# Patient Record
Sex: FEMALE | Race: BLACK OR AFRICAN AMERICAN | NOT HISPANIC OR LATINO | Employment: STUDENT | URBAN - METROPOLITAN AREA
[De-identification: names, ages, dates, MRNs, and addresses within clinical notes are randomized per-mention and may not be internally consistent; named-entity substitution may affect disease eponyms.]

---
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
## 2025-01-08 ENCOUNTER — TELEPHONE (OUTPATIENT)
Dept: OBSTETRICS AND GYNECOLOGY | Facility: CLINIC | Age: 20
End: 2025-01-08
Payer: MEDICAID

## 2025-01-08 ENCOUNTER — PATIENT MESSAGE (OUTPATIENT)
Facility: CLINIC | Age: 20
End: 2025-01-08
Payer: MEDICAID

## 2025-01-08 NOTE — TELEPHONE ENCOUNTER
----- Message from Shot[place] sent at 1/6/2025  1:10 PM CST -----  Type:  Needs Medical Advice    Who Called:  pt mother   Symptoms (please be specific):  severe headaches /ongoing bleeding    How long has patient had these symptoms:   pt has had a cycle since 11/22/2024   Pharmacy name and phone #:   n/a   Would the patient rather a call back or a response via [facility]? Call back   Best Call Back Number:  [phone] are [phone] and this her mother number  Additional Information:

## 2025-03-21 ENCOUNTER — TELEPHONE (OUTPATIENT)
Dept: PRIMARY CARE CLINIC | Facility: CLINIC | Age: 20
End: 2025-03-21
Payer: MEDICAID

## 2025-03-21 NOTE — TELEPHONE ENCOUNTER
Pt c/o Hx of extended menstrual cycles with heavy flow. Pt c/o being light headed and fatigued. This nurse instructed pt and proxy to go to ED to be seen and appt scheduled for 3-24-25 with SHREE aponte. Pt and proxy verbalized understand. No acute distress noted during call

## 2025-03-21 NOTE — TELEPHONE ENCOUNTER
----- Message from Anita sent at 3/21/2025  9:13 AM CDT -----  Contact: [phone] [facility]/mom  Pt thought her appt was for 10 AM today. They were putting the address in the phone and then noticed the time was for 9 AM. Mom is only off on Friday's and states it is very important pt be seen urgently. She would like to know if there is anyway pt would still be seen today even if they have to wait? Please call mom to discuss her being rs, mom states they can even wait until the last appt if needed.

## 2025-03-24 ENCOUNTER — OFFICE VISIT (OUTPATIENT)
Dept: PRIMARY CARE CLINIC | Facility: CLINIC | Age: 20
End: 2025-03-24
Payer: MEDICAID

## 2025-03-24 VITALS
DIASTOLIC BLOOD PRESSURE: 80 MMHG | OXYGEN SATURATION: 99 % | TEMPERATURE: 98 F | BODY MASS INDEX: 27.06 KG/M2 | WEIGHT: 145.19 LBS | HEART RATE: 84 BPM | SYSTOLIC BLOOD PRESSURE: 119 MMHG

## 2025-03-24 DIAGNOSIS — Z98.890 HISTORY OF BILATERAL BREAST REDUCTION SURGERY: ICD-10-CM

## 2025-03-24 DIAGNOSIS — N92.6 IRREGULAR MENSTRUAL CYCLE: ICD-10-CM

## 2025-03-24 DIAGNOSIS — H61.23 BILATERAL IMPACTED CERUMEN: ICD-10-CM

## 2025-03-24 DIAGNOSIS — Z11.3 SCREEN FOR STD (SEXUALLY TRANSMITTED DISEASE): ICD-10-CM

## 2025-03-24 DIAGNOSIS — Z13.29 SCREENING FOR THYROID DISORDER: ICD-10-CM

## 2025-03-24 DIAGNOSIS — F40.10 SOCIAL ANXIETY DISORDER: ICD-10-CM

## 2025-03-24 DIAGNOSIS — L30.9 ECZEMA, UNSPECIFIED TYPE: ICD-10-CM

## 2025-03-24 DIAGNOSIS — Z13.1 SCREENING FOR DIABETES MELLITUS: ICD-10-CM

## 2025-03-24 DIAGNOSIS — D57.3 SICKLE CELL TRAIT: ICD-10-CM

## 2025-03-24 DIAGNOSIS — R10.9 ABDOMINAL CRAMPING: ICD-10-CM

## 2025-03-24 DIAGNOSIS — Z00.00 HEALTHCARE MAINTENANCE: ICD-10-CM

## 2025-03-24 DIAGNOSIS — F41.1 GAD (GENERALIZED ANXIETY DISORDER): ICD-10-CM

## 2025-03-24 DIAGNOSIS — Z11.59 ENCOUNTER FOR HEPATITIS C SCREENING TEST FOR LOW RISK PATIENT: ICD-10-CM

## 2025-03-24 DIAGNOSIS — Z13.220 SCREENING CHOLESTEROL LEVEL: ICD-10-CM

## 2025-03-24 DIAGNOSIS — Z11.4 SCREENING FOR HIV (HUMAN IMMUNODEFICIENCY VIRUS): ICD-10-CM

## 2025-03-24 DIAGNOSIS — R53.83 FATIGUE, UNSPECIFIED TYPE: ICD-10-CM

## 2025-03-24 DIAGNOSIS — R14.0 ABDOMINAL BLOATING: Primary | ICD-10-CM

## 2025-03-24 DIAGNOSIS — Z71.2 ENCOUNTER TO DISCUSS TEST RESULTS: ICD-10-CM

## 2025-03-24 DIAGNOSIS — Z80.0 FAMILY HISTORY OF STOMACH CANCER: ICD-10-CM

## 2025-03-24 PROCEDURE — 99215 OFFICE O/P EST HI 40 MIN: CPT | Mod: S$PBB,,, | Performed by: NURSE PRACTITIONER

## 2025-03-24 PROCEDURE — 81003 URINALYSIS AUTO W/O SCOPE: CPT | Performed by: NURSE PRACTITIONER

## 2025-03-24 PROCEDURE — 1160F RVW MEDS BY RX/DR IN RCRD: CPT | Mod: CPTII,,, | Performed by: NURSE PRACTITIONER

## 2025-03-24 PROCEDURE — 3074F SYST BP LT 130 MM HG: CPT | Mod: CPTII,,, | Performed by: NURSE PRACTITIONER

## 2025-03-24 PROCEDURE — 3079F DIAST BP 80-89 MM HG: CPT | Mod: CPTII,,, | Performed by: NURSE PRACTITIONER

## 2025-03-24 PROCEDURE — 99999 PR PBB SHADOW E&M-EST. PATIENT-LVL V: CPT | Mod: PBBFAC,,, | Performed by: NURSE PRACTITIONER

## 2025-03-24 PROCEDURE — 3008F BODY MASS INDEX DOCD: CPT | Mod: CPTII,,, | Performed by: NURSE PRACTITIONER

## 2025-03-24 PROCEDURE — 99215 OFFICE O/P EST HI 40 MIN: CPT | Mod: PBBFAC,PN | Performed by: NURSE PRACTITIONER

## 2025-03-24 PROCEDURE — 1159F MED LIST DOCD IN RCRD: CPT | Mod: CPTII,,, | Performed by: NURSE PRACTITIONER

## 2025-03-24 RX ORDER — TRIAMCINOLONE ACETONIDE 0.25 MG/G
CREAM TOPICAL 2 TIMES DAILY PRN
Qty: 80 G | Refills: 1 | Status: SHIPPED | OUTPATIENT
Start: 2025-03-24

## 2025-03-24 NOTE — PATIENT INSTRUCTIONS
Please get your labs done at the laboratory today, once you have checked out for this appointment, we will get you schedule for  labs to be done today.    I will communicate your laboratory and/or imaging results with you through your [facility] account that was set up through the portal, it was a pleasure meeting and taking care of you today.       [facility]  [address] [place] [id number]    You can call any of the following numbers to schedule your referral, if you could not get scheduled today: [phone] or [phone].     [phone]   Fax [phone]

## 2025-03-24 NOTE — PROGRESS NOTES
Subjective:       Patient ID: Beti Moser is a 19 y.o. female.    Chief Complaint: Menstrual Problem and multiple concerns    History of Present Illness  CHIEF COMPLAINT:  Patient presents today for irregular menstrual cycles    MENSTRUAL HISTORY:  She reports irregular menstrual cycles since February 2023. She had a period in February 2023, followed by amenorrhea until June. In June, she experienced menstrual bleeding lasting approximately one month, followed by amenorrhea until November. From November to February 2024, she had continuous menstrual bleeding.    CURRENT SYMPTOMS:  She experiences dizziness and motion sickness. She reports abdominal cramping occurring a few times per week with current pain rated as 3/10. She experiences fatigue at work requiring breaks.    MEDICAL HISTORY:  She has sickle cell trait diagnosed at age 5 at [facility], generalized anxiety, and a history of eczema with current recurrence.    FAMILY HISTORY:  She has diabetes on both sides of the family affecting her mother and grandmother. Her maternal and paternal grandfathers had stomach cancer. Her maternal grandmother had autonomic dysfunction resulting in death at age 64. She expresses concern about hereditary autonomic dysfunction, noting similarities between current symptoms and her mother's symptoms.    SURGICAL HISTORY:  She underwent bilateral breast reduction surgery on February 6, 2023.    SOCIAL HISTORY:  She is not sexually active.     History reviewed. No pertinent past medical history.     Past Surgical History:   Procedure Laterality Date    REDUCTION OF BOTH BREASTS Bilateral 2/6/2024    Procedure: MAMMOPLASTY, REDUCTION, BILATERAL;  Surgeon: Chad Hernandez MD;  Location: [facility] OR [address] [place] [address];  Service: Plastics;  Laterality: Bilateral;        Family History   Problem Relation Name Age of Onset    Migraines Father      Migraines Maternal Grandmother         Tobacco Use History[1]    Social History     Social History  Narrative    Not on file       Review of patient's allergies indicates:  No Known Allergies     Review of Systems   Constitutional:  Positive for fatigue.   Respiratory:  Negative for chest tightness, shortness of breath and wheezing.    Cardiovascular:  Negative for chest pain and palpitations.   Gastrointestinal:  Negative for nausea and vomiting.   Neurological:  Negative for dizziness, light-headedness and headaches.   Psychiatric/Behavioral:  The patient is nervous/anxious.          Objective:      Vitals:    03/24/25 0852   BP: 119/80   BP Location: Right arm   Patient Position: Sitting   Pulse: 84   Temp: 97.5 °F (36.4 °C)   TempSrc: Oral   SpO2: 99%   Weight: 65.9 kg (145 lb 2.8 oz)      Physical Exam  HENT:      Right Ear: There is impacted cerumen.      Left Ear: There is impacted cerumen.      Nose: No congestion or rhinorrhea.      Mouth/Throat:      Mouth: Mucous membranes are moist.      Pharynx: Oropharynx is clear.   Eyes:      Conjunctiva/sclera: Conjunctivae normal.   Pulmonary:      Effort: Pulmonary effort is normal. No respiratory distress.   Neurological:      Mental Status: She is alert and oriented to person, place, and time.   Psychiatric:         Mood and Affect: Mood is anxious.         Speech: Speech is delayed.         Behavior: Behavior is slowed.         Assessment:       1. Abdominal bloating    2. Irregular menstrual cycle    3. Abdominal cramping    4. Fatigue, unspecified type    5. Sickle cell trait    6. Healthcare maintenance    7. Eczema, unspecified type    8. Social anxiety disorder    9. JASON (generalized anxiety disorder)    10. Bilateral impacted cerumen    11. Screening cholesterol level    12. Screening for HIV (human immunodeficiency virus)    13. Encounter for hepatitis C screening test for low risk patient    14. Screening for thyroid disorder    15. Screening for diabetes mellitus    16. History of bilateral breast reduction surgery    17. Family history of stomach  cancer    18. Screen for STD (sexually transmitted disease)    19. Encounter to discuss test results        Plan:       Abdominal bloating  Rule out gastrointestinal disorders, cyst.  -     US Abdomen Complete; Future; Expected date: 03/24/2025    Irregular menstrual cycle  Concerns about menstrual cycle lasting from November 2024-February 2025, reports abnormal cycle since breast reduction, Feb 2024.  -     Ambulatory referral/consult to Gynecology; Future; Expected date: 03/24/2025    Abdominal cramping  -     POCT urine pregnancy    Fatigue, unspecified type  -     Urinalysis, Reflex to Urine Culture  -     ZAYRA Screen w/Reflex; Future; Expected date: 03/24/2025  -     Sedimentation rate; Future; Expected date: 03/24/2025  -     Cryoglobulin; Future; Expected date: 03/24/2025  -     Rheumatoid Factor; Future; Expected date: 03/24/2025  -     Iron and TIBC; Future; Expected date: 03/24/2025  -     Ferritin; Future; Expected date: 03/24/2025  -     Ivan-Barr Virus (EBV) Antibody Panel; Future; Expected date: 03/24/2025    Sickle cell trait  Previously diagnosed at age 4-5 years old.  -     Hemoglobin Electrophoresis,Hgb A2 Cuong.; Future; Expected date: 03/24/2025    Healthcare maintenance  -     CBC Auto Differential; Future; Expected date: 03/24/2025  -     Comprehensive Metabolic Panel; Future; Expected date: 03/24/2025  -     Vitamin D; Future; Expected date: 03/24/2025    Eczema, unspecified type  -     triamcinolone acetonide 0.025% (KENALOG) 0.025 % cream; Apply topically 2 (two) times daily as needed (dry skin on body). Apply to affected area. Do not apply to face and armpits. External use only.  Dispense: 80 g; Refill: 1    Social anxiety disorder  -     Ambulatory referral/consult to Psychiatry; Future; Expected date: 03/24/2025    JASON (generalized anxiety disorder)  -     Ambulatory referral/consult to Psychiatry; Future; Expected date: 03/24/2025    Bilateral impacted cerumen    Screening cholesterol  level  -     Lipid Panel; Future; Expected date: 03/24/2025    Screening for HIV (human immunodeficiency virus)  -     HIV 1/2 Ag/Ab (4th Gen); Future; Expected date: 03/24/2025    Encounter for hepatitis C screening test for low risk patient  -     Hepatitis C Antibody; Future; Expected date: 03/24/2025    Screening for thyroid disorder  -     T4, Free; Future; Expected date: 03/24/2025  -     TSH; Future; Expected date: 03/24/2025    Screening for diabetes mellitus  -     Hemoglobin A1C; Future; Expected date: 03/24/2025    History of bilateral breast reduction surgery  Comments:  2/06/2024    Family history of stomach cancer  Comments:  maternal and paternal grandfathers    Screen for STD (sexually transmitted disease)  -     Cancel: C. trachomatis/N. gonorrheae by AMP DNA; Future; Expected date: 03/24/2025  -     C. trachomatis/N. gonorrheae by AMP DNA    Encounter to discuss test results  10/2024  Review and discussion of test results       Assessment & Plan  ECZEMA:  - Educated on importance of proper skin moisturization, especially for eczema management.  -Maintain hydration.  - Continued triamcinolone cream for eczema management.    FATIGUE:  - Discussed benefits of regular physical activity for overall health and fatigue management.  - Recommend increasing physical activity by finding a park to walk in at least 2 times per week.  - Ordered comprehensive labs including CBC, CMP, thyroid panel, vitamin D, diabetes screening, autoimmune markers (ZAYRA), urinalysis, and pregnancy test to investigate fatigue, dizziness, and irregular menstrual cycles.    GENERAL ANXIETY DISORDER:  - Explained importance of journaling for mental health and stress management.  - Patient to start journaling daily, particularly before bed, to address anxiety and process emotions.  - Referred to [facility] for evaluation and treatment.    ABDOMINAL BLOATING:  - Scheduled abdominal US to evaluate reported abdominal pain and  bloating.    IRREGULAR MENSTRUAL CYCLE:  - Referred to Gynecology.    GENERAL HEALTH MANAGEMENT:  - Recommend limiting consumption of sugary drinks.      FOLLOW-UP CARE:  - Follow up in 2 weeks to review lab results and ultrasound findings.  - Contact the office if any concerning symptoms arise before follow-up visit.     Medication List with Changes/Refills   Changed and/or Refilled Medications    Modified Medication Previous Medication    TRIAMCINOLONE ACETONIDE 0.025% (KENALOG) 0.025 % CREAM triamcinolone acetonide 0.025% (KENALOG) 0.025 % cream       Apply topically 2 (two) times daily as needed (dry skin on body). Apply to affected area. Do not apply to face and armpits. External use only.    Apply topically 2 (two) times daily as needed (dry skin on body).   Discontinued Medications    CETIRIZINE (ZYRTEC) 10 MG TABLET    Take 1 tablet (10 mg total) by mouth once daily.    FLUTICASONE PROPIONATE (FLONASE) 50 MCG/ACTUATION NASAL SPRAY    1 spray (50 mcg total) by Each Nostril route once daily.    MAGNESIUM OXIDE 200 MG MAGNESIUM TAB    Take 1 tab daily    NAPROXEN (NAPROSYN) 250 MG TABLET    Take 1 tablet (250 mg total) by mouth 2 (two) times daily.            Follow up in about 2 weeks (around 4/7/2025) for Alessia Mcneil, MSN, APRN, FNP-C.    I spent a total of 55 minutes on the day of the visit.This includes face to face time and non-face to face time preparing to see the patient (eg, review of tests), obtaining and/or reviewing separately obtained history, documenting clinical information in the electronic or other health record, independently interpreting results and communicating results to the patient/family/caregiver, or care coordinator.   ADRIEL Carreno, MSN, FNP-C     This note was generated with the assistance of ambient listening technology. Verbal consent was obtained by the patient and accompanying visitor(s) for the recording of patient appointment to facilitate this note. I attest to  having reviewed and edited the generated note for accuracy, though some syntax or spelling errors may persist. Please contact the author of this note for any clarification.            [1]   Social History  Tobacco Use   Smoking Status Never   Smokeless Tobacco Never

## 2025-03-25 ENCOUNTER — HOSPITAL ENCOUNTER (OUTPATIENT)
Dept: RADIOLOGY | Facility: HOSPITAL | Age: 20
Discharge: HOME | End: 2025-03-25
Attending: NURSE PRACTITIONER
Payer: MEDICAID

## 2025-03-25 DIAGNOSIS — R14.0 ABDOMINAL BLOATING: ICD-10-CM

## 2025-03-25 LAB
BILIRUB UR QL STRIP.AUTO: NEGATIVE
CLARITY UR: CLEAR
COLOR UR AUTO: YELLOW
GLUCOSE UR QL STRIP: NEGATIVE
HGB UR QL STRIP: NEGATIVE
KETONES UR QL STRIP: NEGATIVE
LEUKOCYTE ESTERASE UR QL STRIP: NEGATIVE
NITRITE UR QL STRIP: NEGATIVE
PH UR STRIP: 7 [PH]
PROT UR QL STRIP: NEGATIVE
SP GR UR STRIP: 1.01
UROBILINOGEN UR STRIP-ACNC: NEGATIVE EU/DL

## 2025-03-25 PROCEDURE — 76700 US EXAM ABDOM COMPLETE: CPT | Mod: 26,,, | Performed by: RADIOLOGY

## 2025-03-25 PROCEDURE — 76700 US EXAM ABDOM COMPLETE: CPT | Mod: TC

## 2025-03-27 ENCOUNTER — PATIENT MESSAGE (OUTPATIENT)
Dept: PSYCHOLOGY | Facility: CLINIC | Age: 20
End: 2025-03-27
Payer: MEDICAID

## 2025-03-27 ENCOUNTER — RESULTS FOLLOW-UP (OUTPATIENT)
Dept: PRIMARY CARE CLINIC | Facility: CLINIC | Age: 20
End: 2025-03-27
Payer: MEDICAID

## 2025-03-27 DIAGNOSIS — B27.00 POSITIVE TEST FOR EPSTEIN-BARR VIRUS (EBV): ICD-10-CM

## 2025-03-27 DIAGNOSIS — D57.3 SICKLE CELL TRAIT: ICD-10-CM

## 2025-03-27 DIAGNOSIS — E55.9 VITAMIN D INSUFFICIENCY: Primary | ICD-10-CM

## 2025-03-27 PROCEDURE — 99999 PR PBB SHADOW E&M-EST. PATIENT-LVL I: CPT | Mod: PBBFAC,,, | Performed by: NURSE PRACTITIONER

## 2025-03-28 DIAGNOSIS — B27.90 EBV INFECTION: Primary | ICD-10-CM

## 2025-03-28 RX ORDER — ERGOCALCIFEROL 1.25 MG/1
50000 CAPSULE ORAL
Qty: 4 CAPSULE | Refills: 1 | Status: SHIPPED | OUTPATIENT
Start: 2025-03-28 | End: 2025-04-22 | Stop reason: SDUPTHER

## 2025-03-31 ENCOUNTER — OFFICE VISIT (OUTPATIENT)
Dept: OBSTETRICS AND GYNECOLOGY | Facility: CLINIC | Age: 20
End: 2025-03-31
Payer: MEDICAID

## 2025-03-31 VITALS
SYSTOLIC BLOOD PRESSURE: 118 MMHG | BODY MASS INDEX: 27.47 KG/M2 | WEIGHT: 145.5 LBS | HEIGHT: 61 IN | DIASTOLIC BLOOD PRESSURE: 70 MMHG

## 2025-03-31 DIAGNOSIS — D57.3 SICKLE CELL TRAIT: Primary | ICD-10-CM

## 2025-03-31 DIAGNOSIS — E28.2 PCOS (POLYCYSTIC OVARIAN SYNDROME): ICD-10-CM

## 2025-03-31 DIAGNOSIS — N92.6 IRREGULAR MENSTRUAL CYCLE: ICD-10-CM

## 2025-03-31 PROCEDURE — 1160F RVW MEDS BY RX/DR IN RCRD: CPT | Mod: CPTII,,, | Performed by: OBSTETRICS & GYNECOLOGY

## 2025-03-31 PROCEDURE — 3078F DIAST BP <80 MM HG: CPT | Mod: CPTII,,, | Performed by: OBSTETRICS & GYNECOLOGY

## 2025-03-31 PROCEDURE — 1159F MED LIST DOCD IN RCRD: CPT | Mod: CPTII,,, | Performed by: OBSTETRICS & GYNECOLOGY

## 2025-03-31 PROCEDURE — 99204 OFFICE O/P NEW MOD 45 MIN: CPT | Mod: S$PBB,,, | Performed by: OBSTETRICS & GYNECOLOGY

## 2025-03-31 PROCEDURE — 99999 PR PBB SHADOW E&M-EST. PATIENT-LVL III: CPT | Mod: PBBFAC,,, | Performed by: OBSTETRICS & GYNECOLOGY

## 2025-03-31 PROCEDURE — 3074F SYST BP LT 130 MM HG: CPT | Mod: CPTII,,, | Performed by: OBSTETRICS & GYNECOLOGY

## 2025-03-31 PROCEDURE — 3008F BODY MASS INDEX DOCD: CPT | Mod: CPTII,,, | Performed by: OBSTETRICS & GYNECOLOGY

## 2025-03-31 PROCEDURE — 99213 OFFICE O/P EST LOW 20 MIN: CPT | Mod: PBBFAC | Performed by: OBSTETRICS & GYNECOLOGY

## 2025-03-31 PROCEDURE — 3044F HG A1C LEVEL LT 7.0%: CPT | Mod: CPTII,,, | Performed by: OBSTETRICS & GYNECOLOGY

## 2025-03-31 RX ORDER — NORGESTIMATE AND ETHINYL ESTRADIOL 0.25-0.035
1 KIT ORAL DAILY
Qty: 30 TABLET | Refills: 11 | Status: SHIPPED | OUTPATIENT
Start: 2025-03-31 | End: 2026-03-31

## 2025-03-31 NOTE — PROGRESS NOTES
Subjective     Patient ID: Beti Moser is a 19 y.o. female.    Chief Complaint:  Menstrual Problem (NP with irregular menses since 02/2024. Her cycles would come every other month or skip 2 months. Some cycles would last for 1-2 months.)      History of Present Illness  HPI  Patient brought in by her mother complaining of having irregular heavy menses for the past year.  Cycles comes every other month for awhile, then every three months.  Some would last for over a month.  Menarche at 10   Has never been sexually-active   History of acne and eczema. On various meds    Sickle cell trait      GYN & OB History  Patient's last menstrual period was 02/09/2025 (exact date).   Date of Last Pap: No result found    OB History   No obstetric history on file.     History reviewed. No pertinent past medical history.    Past Surgical History:   Procedure Laterality Date    REDUCTION OF BOTH BREASTS Bilateral 2/6/2024    Procedure: MAMMOPLASTY, REDUCTION, BILATERAL;  Surgeon: Chad Hernandez MD;  Location: [facility] OR [address] [place] [address];  Service: Plastics;  Laterality: Bilateral;       Family History   Problem Relation Name Age of Onset    Colon cancer Paternal Grandfather      Migraines Maternal Grandmother      Cancer Maternal Grandfather          stomach    Migraines Father         Social History     Socioeconomic History    Marital status: Single   Tobacco Use    Smoking status: Never    Smokeless tobacco: Never   Substance and Sexual Activity    Alcohol use: No    Drug use: No    Sexual activity: Never     Social Drivers of Health     Financial Resource Strain: Medium Risk (3/24/2025)    Overall Financial Resource Strain (CARDIA)     Difficulty of Paying Living Expenses: Somewhat hard   Food Insecurity: Food Insecurity Present (3/24/2025)    Hunger Vital Sign     Worried About Running Out of Food in the Last Year: Sometimes true     Ran Out of Food in the Last Year: Sometimes true   Transportation Needs: No Transportation Needs  (3/24/2025)    PRAPARE - Transportation     Lack of Transportation (Medical): No     Lack of Transportation (Non-Medical): No   Physical Activity: Unknown (3/24/2025)    Exercise Vital Sign     Days of Exercise per Week: 0 days   Stress: Stress Concern Present (3/24/2025)    [employer] - Occupational Stress Questionnaire     Feeling of Stress : Rather much   Housing Stability: High Risk (3/24/2025)    Housing Stability Vital Sign     Unable to Pay for Housing in the Last Year: Yes     Number of Times Moved in the Last Year: 0     Homeless in the Last Year: No       Current Medications[1]    Review of patient's allergies indicates:  No Known Allergies      Review of Systems  Review of Systems   Constitutional:  Negative for activity change, appetite change, chills, fatigue, fever and unexpected weight change.   HENT:  Negative for mouth sores.    Respiratory:  Negative for cough, shortness of breath and wheezing.    Cardiovascular:  Negative for chest pain and palpitations.   Gastrointestinal:  Negative for abdominal pain, bloating, blood in stool, constipation, nausea and vomiting.   Endocrine: Negative for diabetes and hot flashes.   Genitourinary:  Positive for menorrhagia, menstrual problem and vaginal bleeding. Negative for dysmenorrhea, dyspareunia, dysuria, frequency, hematuria, pelvic pain, urgency, vaginal discharge, vaginal pain, urinary incontinence, postcoital bleeding and vaginal odor.   Musculoskeletal:  Negative for back pain and myalgias.   Integumentary:  Negative for rash, breast mass and nipple discharge.   Neurological:  Negative for seizures and headaches.   Psychiatric/Behavioral:  Negative for depression and sleep disturbance. The patient is not nervous/anxious.    Breast: Negative for mass, mastodynia and nipple discharge         Objective   Physical Exam:   Constitutional: She appears well-developed and well-nourished. No distress.    HENT:   Head: Normocephalic  and atraumatic.    Eyes: EOM are normal.     Cardiovascular:  Normal rate.             Pulmonary/Chest: Effort normal. No respiratory distress.        Abdominal: Soft. She exhibits no distension. There is no abdominal tenderness. There is no rebound and no guarding.             Musculoskeletal: Normal range of motion.       Neurological: She is alert.    Skin: Skin is warm and dry.   Acanthosis nigricans on neck, between breasts and on abdomen    Psychiatric: She has a normal mood and affect.            Assessment and Plan     1. Sickle cell trait    2. Irregular menstrual cycle    3. PCOS (polycystic ovarian syndrome)           Plan:  I have discussed with the patient and her mother regarding her condition.  PCOS and metabolic syndrome with insulin insensitivity explained and discussed  Pelvic ultrasound ordered  Encourage exercise and weight loss  Sprintec to help regulate menses  Back in 2 months    Total time: 45 min                   [1]   Current Outpatient Medications   Medication Sig Dispense Refill    ergocalciferol (ERGOCALCIFEROL) 50,000 unit Cap Take 1 capsule (50,000 Units total) by mouth every 7 days. for 8 doses 4 capsule 1    triamcinolone acetonide 0.025% (KENALOG) 0.025 % cream Apply topically 2 (two) times daily as needed (dry skin on body). Apply to affected area. Do not apply to face and armpits. External use only. 80 g 1    norgestimate-ethinyl estradioL (SPRINTEC, 28,) 0.25-35 mg-mcg per tablet Take 1 tablet by mouth once daily. 30 tablet 11     No current facility-administered medications for this visit.

## 2025-04-07 ENCOUNTER — TELEPHONE (OUTPATIENT)
Dept: HEMATOLOGY/ONCOLOGY | Facility: CLINIC | Age: 20
End: 2025-04-07
Payer: MEDICAID

## 2025-04-07 ENCOUNTER — OFFICE VISIT (OUTPATIENT)
Dept: PRIMARY CARE CLINIC | Facility: CLINIC | Age: 20
End: 2025-04-07
Payer: MEDICAID

## 2025-04-07 VITALS
HEIGHT: 61 IN | OXYGEN SATURATION: 96 % | DIASTOLIC BLOOD PRESSURE: 80 MMHG | TEMPERATURE: 99 F | BODY MASS INDEX: 27.83 KG/M2 | WEIGHT: 147.38 LBS | HEART RATE: 86 BPM | SYSTOLIC BLOOD PRESSURE: 118 MMHG

## 2025-04-07 DIAGNOSIS — F40.10 SOCIAL ANXIETY DISORDER: ICD-10-CM

## 2025-04-07 DIAGNOSIS — R53.83 OTHER FATIGUE: ICD-10-CM

## 2025-04-07 DIAGNOSIS — F41.1 GAD (GENERALIZED ANXIETY DISORDER): ICD-10-CM

## 2025-04-07 DIAGNOSIS — E55.9 VITAMIN D INSUFFICIENCY: ICD-10-CM

## 2025-04-07 DIAGNOSIS — E28.2 PCOS (POLYCYSTIC OVARIAN SYNDROME): ICD-10-CM

## 2025-04-07 DIAGNOSIS — Z71.2 ENCOUNTER TO DISCUSS TEST RESULTS: Primary | ICD-10-CM

## 2025-04-07 DIAGNOSIS — R53.83 FATIGUE, UNSPECIFIED TYPE: ICD-10-CM

## 2025-04-07 DIAGNOSIS — D57.3 SICKLE CELL TRAIT: ICD-10-CM

## 2025-04-07 DIAGNOSIS — B27.00 POSITIVE TEST FOR EPSTEIN-BARR VIRUS (EBV): ICD-10-CM

## 2025-04-07 DIAGNOSIS — Z83.2 FAMILY HISTORY OF AUTOIMMUNE DISORDER: ICD-10-CM

## 2025-04-07 DIAGNOSIS — F90.9 ATTENTION DEFICIT HYPERACTIVITY DISORDER (ADHD), UNSPECIFIED ADHD TYPE: ICD-10-CM

## 2025-04-07 DIAGNOSIS — R14.0 ABDOMINAL BLOATING: ICD-10-CM

## 2025-04-07 PROCEDURE — 99214 OFFICE O/P EST MOD 30 MIN: CPT | Mod: PBBFAC,PN | Performed by: NURSE PRACTITIONER

## 2025-04-07 PROCEDURE — 3074F SYST BP LT 130 MM HG: CPT | Mod: CPTII,,, | Performed by: NURSE PRACTITIONER

## 2025-04-07 PROCEDURE — 3008F BODY MASS INDEX DOCD: CPT | Mod: CPTII,,, | Performed by: NURSE PRACTITIONER

## 2025-04-07 PROCEDURE — 99215 OFFICE O/P EST HI 40 MIN: CPT | Mod: S$PBB,,, | Performed by: NURSE PRACTITIONER

## 2025-04-07 PROCEDURE — 99999 PR PBB SHADOW E&M-EST. PATIENT-LVL IV: CPT | Mod: PBBFAC,,, | Performed by: NURSE PRACTITIONER

## 2025-04-07 PROCEDURE — 3044F HG A1C LEVEL LT 7.0%: CPT | Mod: CPTII,,, | Performed by: NURSE PRACTITIONER

## 2025-04-07 PROCEDURE — 1159F MED LIST DOCD IN RCRD: CPT | Mod: CPTII,,, | Performed by: NURSE PRACTITIONER

## 2025-04-07 PROCEDURE — 3079F DIAST BP 80-89 MM HG: CPT | Mod: CPTII,,, | Performed by: NURSE PRACTITIONER

## 2025-04-07 NOTE — PATIENT INSTRUCTIONS
You can call any of the following numbers to schedule your referral (hematology and immunology), if you could not get scheduled today: [phone] or [phone].       Ivan-Barr virus, or EBV, is one of the most common human viruses in the world. EBV is also known as human herpesvirus 4 and is a member of the herpes virus family.  Most people will get infected with EBV in their lifetime, especially in childhood, and will not have symptoms. EBV infections in children usually do not cause symptoms, or the symptoms are not distinguishable from other mild, brief childhood illnesses.

## 2025-04-07 NOTE — TELEPHONE ENCOUNTER
Unfortunately the [facility] does not schedule  sickle cell patients  as we are not equipped to medically serve them in an appropriate manner.  They are all seen at [facility]  Please contact then to schedule an appointment for patient . Columba birch RN  ===View-only below this line===  ----- Message -----  From: Nalini Esparza MA  Sent: 4/7/2025   9:58 AM CDT  To: Columba Birch RN  Subject: FW: Schedule patient                             Please advise   Thank You,  Nalini:MA  ----- Message -----  From: Jaleel Bo  Sent: 4/7/2025   9:32 AM CDT  To: [facility] Onc Clinical Staff  Subject: Schedule patient                                 Please reach out to the above patient to schedule appointment. Thanks

## 2025-04-07 NOTE — LETTER
April 7, 2025      [facility]  [address] [place] [address]       Patient: Beti Moser   YOB: 2005  Date of Visit: 04/07/2025    To Whom It May Concern:    Tram Moser  was at [facility] on 04/07/2025 with her mother (Lesley Mcneil). Please excuse Ms Cassandra Mcneil as she accompanied her daughter for this appointment.  If you have any questions or concerns, or if I can be of further assistance, please do not hesitate to contact me.    Sincerely,       Alessia Mcneil NP

## 2025-04-07 NOTE — PROGRESS NOTES
Subjective:       Patient ID: Beti Moser is a 19 y.o. female.    Chief Complaint: Follow-up (Test Results )    History of Present Illness    CHIEF COMPLAINT:  Patient presents today for follow up of fatigue, lab results, accompanied by her mother, Cassandra Mcneil.    FATIGUE:  She reports fatigue associated with periods of physical activity, denying sudden onset.    MENTAL HEALTH:  She was recently diagnosed with ADHD and generalized anxiety. She has initiated care with a psychiatrist, having attended her first appointment on Monday. She was referred to group therapy but prefers individual therapy sessions.    PCOS:  She was recently diagnosed with PCOS by gynecologist. Associated symptoms include bloating and excessive hair growth. She started birth control therapy last week with no significant changes noted yet.    LABS AND TEST RESULTS:  CBC was normal with no evidence of anemia. Electrolytes, kidney function, and liver function tests were normal. Cholesterol panel was normal. Vitamin D level was low. Thyroid function tests were normal. Testing was positive for Ivan Barr virus. Hemoglobin electrophoresis showed an irregular component likely attributed to sickle cell trait.      History reviewed. No pertinent past medical history.     Past Surgical History:   Procedure Laterality Date    REDUCTION OF BOTH BREASTS Bilateral 2/6/2024    Procedure: MAMMOPLASTY, REDUCTION, BILATERAL;  Surgeon: Chad Hernandez MD;  Location: [facility] OR [address] [place] [address];  Service: Plastics;  Laterality: Bilateral;        Family History   Problem Relation Name Age of Onset    Colon cancer Paternal Grandfather      Migraines Maternal Grandmother      Cancer Maternal Grandfather          stomach    Migraines Father         Tobacco Use History[1]    Social History     Social History Narrative    Not on file       Review of patient's allergies indicates:  No Known Allergies     Review of Systems   Constitutional:  Positive for fatigue.  "  Respiratory:  Negative for shortness of breath.    Cardiovascular:  Negative for chest pain and palpitations.   Gastrointestinal:  Negative for nausea and vomiting.   Neurological:  Negative for dizziness, light-headedness and headaches.         Objective:      Vitals:    04/07/25 0832   BP: 118/80   BP Location: Left arm   Patient Position: Sitting   Pulse: 86   Temp: 98.5 °F (36.9 °C)   TempSrc: Oral   SpO2: 96%   Weight: 66.8 kg (147 lb 6 oz)   Height: 5' 1" (1.549 m)        Physical Exam  Constitutional:       Appearance: Normal appearance.   Pulmonary:      Effort: Pulmonary effort is normal. No respiratory distress.   Neurological:      Mental Status: She is alert and oriented to person, place, and time.         Assessment:       1. Encounter to discuss test results    2. Positive test for Ivan-Barr virus (EBV)    3. Vitamin D insufficiency    4. Sickle cell trait    5. Fatigue, unspecified type    6. Social anxiety disorder    7. Abdominal bloating    8. PCOS (polycystic ovarian syndrome)    9. Attention deficit hyperactivity disorder (ADHD), unspecified ADHD type    10. JASON (generalized anxiety disorder)    11. Family history of autoimmune disorder        Plan:       Encounter to discuss test results  03/25/2024 Lab results and ultrasound imaging  Review and discussion of test results, all questions addressed and answered accordingly.    Positive test for Ivan-Barr virus (EBV)  Complains of fatigue.    Vitamin D insufficiency  Continue with vitamin d replacement as previously ordered.    Sickle cell trait    Fatigue, unspecified type    Social anxiety disorder  Stable, managed by psychiatry.    Abdominal bloating    PCOS (polycystic ovarian syndrome)    Attention deficit hyperactivity disorder (ADHD), unspecified ADHD type  Stable, managed by psychiatry.    JASON (generalized anxiety disorder)  Stable, managed by psychiatry.       Assessment & Plan    ATTENTION-DEFICIT HYPERACTIVITY DISORDER (ADHD):  - " Provided information on ADHD, its symptoms, and potential impact on daily life and academic performance.  - Patient reports difficulty focusing and fidgety behavior.  - Psychiatrist diagnosed ADHD after reviewing the patient's charts.  - Acknowledged ADHD diagnosis and discussed potential impact on college education.  - Psychiatrist plans to prescribe medication to manage ADHD symptoms.  - Recommend group therapy as part of the treatment plan.  - Advised the patient to continue with individual therapy sessions.  - Recommend participation in group therapy sessions at [facility].    GENERALIZED ANXIETY DISORDER:  - Psychiatrist diagnosed generalized anxiety after reviewing the patient's charts.  - Acknowledged anxiety diagnosis and discussed its impact on the patient's life.  - Psychiatrist plans to prescribe medication to manage anxiety symptoms.    POLYCYSTIC OVARIAN SYNDROME (PCOS):  - Discussed PCOS symptoms and their potential overlap with eczema.  - Patient reports bloating and hirsutism as symptoms of PCOS.  - Gynecologist diagnosed PCOS after exam.  - Acknowledged PCOS diagnosis and its connection to the patient's symptoms.  - Gynecologist prescribed oral contraceptives for PCOS management.  - Recommend annual follow-up with gynecologist.  - Patient started oral contraceptives recently for PCOS management.  - Patient reports no significant changes yet from oral contraceptive use.    FATIGUE:  - Patient reports ongoing fatigue, especially during physical activity.  - Observed improvement in the patient's appearance despite ongoing fatigue.  - Referred the patient to immunology for further evaluation family history of autoimmune disease.      VITAMIN D INSUFFICIENCY:  - Blood work revealed low vitamin D levels, 03/25/2025 Vitamin D-13.  - Prescribed vitamin D supplementation.    Positive test for Ivan-Barr virus (EBV):  - Explained Ivan-Barr virus infection, its common occurrence, and typical course.  -  Patient tested positive for Ivan-Barr virus.  - Explained Ivan-Barr virus infection and its potential relation to fatigue.  -Recommend treating symptoms as needed.  - Plan to provide more information about Ivan-Barr virus.    SICKLE CELL TRAIT:  - Patient has sickle cell trait previously confirmed by hemoglobin electrophoresis.  - Referred to hematology for further evaluation of sickle cell trait and potential related issues.    FOLLOW-UP:  - Follow up after scheduling appointments with hematology and immunology clinics.      Medication List with Changes/Refills   Current Medications    ERGOCALCIFEROL (ERGOCALCIFEROL) 50,000 UNIT CAP    Take 1 capsule (50,000 Units total) by mouth every 7 days. for 8 doses    NORGESTIMATE-ETHINYL ESTRADIOL (SPRINTEC, 28,) 0.25-35 MG-MCG PER TABLET    Take 1 tablet by mouth once daily.    TRIAMCINOLONE ACETONIDE 0.025% (KENALOG) 0.025 % CREAM    Apply topically 2 (two) times daily as needed (dry skin on body). Apply to affected area. Do not apply to face and armpits. External use only.        Follow up if symptoms worsen or fail to improve.    I spent a total of 45 minutes on the day of the visit.This includes face to face time and non-face to face time preparing to see the patient (eg, review of tests), obtaining and/or reviewing separately obtained history, documenting clinical information in the electronic or other health record, independently interpreting results and communicating results to the patient/family/caregiver, or care coordinator.     ADRIEL Carreno, MSN, FNP-C     This note was generated with the assistance of ambient listening technology. Verbal consent was obtained by the patient and accompanying visitor(s) for the recording of patient appointment to facilitate this note. I attest to having reviewed and edited the generated note for accuracy, though some syntax or spelling errors may persist. Please contact the author of this note for any  clarification.            [1]   Social History  Tobacco Use   Smoking Status Never   Smokeless Tobacco Never

## 2025-04-10 ENCOUNTER — TELEPHONE (OUTPATIENT)
Dept: HEMATOLOGY/ONCOLOGY | Facility: CLINIC | Age: 20
End: 2025-04-10
Payer: MEDICAID

## 2025-04-10 NOTE — TELEPHONE ENCOUNTER
Patient has a referral for SC trait Please assist with scheduling in [facility] clinic Thank you Columba can RN

## 2025-04-16 ENCOUNTER — RESULTS FOLLOW-UP (OUTPATIENT)
Dept: OBSTETRICS AND GYNECOLOGY | Facility: CLINIC | Age: 20
End: 2025-04-16

## 2025-04-16 ENCOUNTER — HOSPITAL ENCOUNTER (OUTPATIENT)
Dept: RADIOLOGY | Facility: HOSPITAL | Age: 20
Discharge: HOME | End: 2025-04-16
Attending: OBSTETRICS & GYNECOLOGY
Payer: MEDICAID

## 2025-04-16 DIAGNOSIS — E28.2 PCOS (POLYCYSTIC OVARIAN SYNDROME): ICD-10-CM

## 2025-04-16 DIAGNOSIS — N92.6 IRREGULAR MENSTRUAL CYCLE: ICD-10-CM

## 2025-04-16 PROCEDURE — 76856 US EXAM PELVIC COMPLETE: CPT | Mod: TC

## 2025-04-16 PROCEDURE — 76856 US EXAM PELVIC COMPLETE: CPT | Mod: 26,,, | Performed by: RADIOLOGY

## 2025-04-22 ENCOUNTER — LAB VISIT (OUTPATIENT)
Dept: LAB | Facility: HOSPITAL | Age: 20
End: 2025-04-22
Attending: PHYSICIAN ASSISTANT
Payer: MEDICAID

## 2025-04-22 ENCOUNTER — OFFICE VISIT (OUTPATIENT)
Dept: HEMATOLOGY/ONCOLOGY | Facility: CLINIC | Age: 20
End: 2025-04-22
Payer: MEDICAID

## 2025-04-22 VITALS
DIASTOLIC BLOOD PRESSURE: 76 MMHG | BODY MASS INDEX: 27.93 KG/M2 | TEMPERATURE: 99 F | WEIGHT: 147.94 LBS | SYSTOLIC BLOOD PRESSURE: 116 MMHG | OXYGEN SATURATION: 97 % | HEIGHT: 61 IN | RESPIRATION RATE: 20 BRPM | HEART RATE: 94 BPM

## 2025-04-22 DIAGNOSIS — N92.6 IRREGULAR MENSES: ICD-10-CM

## 2025-04-22 DIAGNOSIS — E55.9 VITAMIN D INSUFFICIENCY: ICD-10-CM

## 2025-04-22 DIAGNOSIS — R53.83 FATIGUE, UNSPECIFIED TYPE: ICD-10-CM

## 2025-04-22 DIAGNOSIS — R53.1 WEAKNESS: ICD-10-CM

## 2025-04-22 DIAGNOSIS — R13.10 DYSPHAGIA, UNSPECIFIED TYPE: ICD-10-CM

## 2025-04-22 DIAGNOSIS — R06.00 DYSPNEA, UNSPECIFIED TYPE: ICD-10-CM

## 2025-04-22 DIAGNOSIS — D57.3 SICKLE CELL TRAIT: Primary | ICD-10-CM

## 2025-04-22 LAB
FOLATE SERPL-MCNC: 7.3 NG/ML (ref 4–24)
VIT B12 SERPL-MCNC: 377 PG/ML (ref 210–950)

## 2025-04-22 PROCEDURE — 84425 ASSAY OF VITAMIN B-1: CPT

## 2025-04-22 PROCEDURE — 82746 ASSAY OF FOLIC ACID SERUM: CPT

## 2025-04-22 PROCEDURE — 99999 PR PBB SHADOW E&M-EST. PATIENT-LVL IV: CPT | Mod: PBBFAC,,, | Performed by: PHYSICIAN ASSISTANT

## 2025-04-22 PROCEDURE — 82607 VITAMIN B-12: CPT

## 2025-04-22 PROCEDURE — 93010 ELECTROCARDIOGRAM REPORT: CPT | Mod: ,,, | Performed by: INTERNAL MEDICINE

## 2025-04-22 PROCEDURE — 3074F SYST BP LT 130 MM HG: CPT | Mod: CPTII,,, | Performed by: PHYSICIAN ASSISTANT

## 2025-04-22 PROCEDURE — 3078F DIAST BP <80 MM HG: CPT | Mod: CPTII,,, | Performed by: PHYSICIAN ASSISTANT

## 2025-04-22 PROCEDURE — 1159F MED LIST DOCD IN RCRD: CPT | Mod: CPTII,,, | Performed by: PHYSICIAN ASSISTANT

## 2025-04-22 PROCEDURE — 99204 OFFICE O/P NEW MOD 45 MIN: CPT | Mod: S$PBB,,, | Performed by: PHYSICIAN ASSISTANT

## 2025-04-22 PROCEDURE — 3044F HG A1C LEVEL LT 7.0%: CPT | Mod: CPTII,,, | Performed by: PHYSICIAN ASSISTANT

## 2025-04-22 PROCEDURE — 1160F RVW MEDS BY RX/DR IN RCRD: CPT | Mod: CPTII,,, | Performed by: PHYSICIAN ASSISTANT

## 2025-04-22 PROCEDURE — 36415 COLL VENOUS BLD VENIPUNCTURE: CPT

## 2025-04-22 PROCEDURE — 99214 OFFICE O/P EST MOD 30 MIN: CPT | Mod: PBBFAC,25,PO | Performed by: PHYSICIAN ASSISTANT

## 2025-04-22 PROCEDURE — 84207 ASSAY OF VITAMIN B-6: CPT

## 2025-04-22 PROCEDURE — 93005 ELECTROCARDIOGRAM TRACING: CPT

## 2025-04-22 PROCEDURE — 3008F BODY MASS INDEX DOCD: CPT | Mod: CPTII,,, | Performed by: PHYSICIAN ASSISTANT

## 2025-04-22 RX ORDER — ERGOCALCIFEROL 1.25 MG/1
50000 CAPSULE ORAL
Qty: 4 CAPSULE | Refills: 2 | Status: SHIPPED | OUTPATIENT
Start: 2025-04-22 | End: 2025-07-09

## 2025-04-22 RX ORDER — FERROUS SULFATE 325(65) MG
325 TABLET, DELAYED RELEASE (ENTERIC COATED) ORAL EVERY OTHER DAY
Qty: 30 TABLET | Refills: 3 | Status: SHIPPED | OUTPATIENT
Start: 2025-04-22 | End: 2025-12-18

## 2025-04-22 NOTE — PROGRESS NOTES
HEMATOLOGY/ONCOLOGY CLINIC VISIT NOTE     PATIENT: Beti Moser  MRN: [id number]  DATE: 4/22/2025    Chief Complaint: Sickle Cell Trait     Subjective:    History of Present Illness: Ms. Moser is a 19 y.o. female who presents for evaluation and management of sickle cell trait, referred by family medicine NP Alessia Mcneil. Other diagnoses include PCOS, eczema, ADHD, vitamin D insufficiency, and anxiety.     She was seen in April by family medicine. Labs indicate mildly low iron saturation, normal ferritin, previous EBV infection. Electrophoresis with Hgb A 55% and HgbS of 41%, indicating sickle cell trait. CBC wnl, no anemia or microcytosis.     Patient presents with her mom today. Her mom reports that she has been having weakness, fatigue, joint pain, and overall malaise for years. Her symptoms seem to have worsened this past year. She even had one episode of syncope. She also reports intermittent dysphagia and the feeling of something being stuck in her throat since October. Her mother expresses frustration about her constellation of symptoms without a clear cause.      No recent sick contacts. Reports dizziness and headaches daily worse with going from laying/sitting to standing. She has weakness and fatigue. Joint pain in her knees and elbows. Also reports shortness of breath and chest pain intermittently with exertion. Has been home schooled since the 5th grade due to her chronic symptoms. She graduated last year from high school, does not have a job right now, but does plan to enroll in school for the fall to study business/finance.     Menses: Started OCPs 2 weeks ago. Irregular menses, length also inconsistent (can be from 3-4 days or has had a cycle that lasted 2 months);    Fam hx: mother and maternal great aunt with heavy menses;  mother is anemic; maternal grandmother had orthostatic blood pressures (autonomic dysfunction)    Social: usually 1L of water per day, no soda, does drink tea daily; coffee and  milk once a week; no dietary restrictions (eats a variety); does not smoke cigarettes, use other tobacco products, or recreational drugs; is not currently and has never been sexually active;    Past medical, surgical, family, and social histories have been reviewed and updated below.    Past Medical History:   Past Medical History:   Diagnosis Date    Frequent headaches 05/07/2015       Past Surgical History:   Past Surgical History:   Procedure Laterality Date    REDUCTION OF BOTH BREASTS Bilateral 2/6/2024    Procedure: MAMMOPLASTY, REDUCTION, BILATERAL;  Surgeon: Chad Hernandez MD;  Location: [facility] OR [address] [place] [address];  Service: Plastics;  Laterality: Bilateral;       Family History:   Family History   Problem Relation Name Age of Onset    Colon cancer Paternal Grandfather      Migraines Maternal Grandmother      Cancer Maternal Grandfather          stomach    Migraines Father         Social History:  reports that she has never smoked. She has never used smokeless tobacco. She reports that she does not drink alcohol and does not use drugs.    Allergies:  Review of patient's allergies indicates:  No Known Allergies    Medications:  Current Medications[1]    Review of Systems   Constitutional:  Positive for fatigue. Negative for appetite change and unexpected weight change.   Respiratory:  Positive for shortness of breath (with exertion).    Cardiovascular:  Positive for chest pain (intermittent).   Gastrointestinal:  Negative for blood in stool, constipation, diarrhea and vomiting.   Genitourinary:  Negative for hematuria.   Musculoskeletal:  Negative for neck pain and neck stiffness.        Has joint pain throughout the day - advil minimally helps   Skin:  Negative for rash.   Neurological:  Positive for dizziness (daily), weakness and headaches (daily).   Hematological:  Negative for adenopathy.       ECOG Performance Status:   ECOG SCORE    1 - Restricted in strenuous activity-ambulatory and able to carry out work of  "a light nature         Objective:      Vitals:   Vitals:    04/22/25 0922   BP: 116/76   BP Location: Left arm   Patient Position: Sitting   Pulse: 94   Resp: 20   Temp: 98.5 °F (36.9 °C)   TempSrc: Oral   SpO2: 97%   Weight: 67.1 kg (147 lb 14.9 oz)   Height: 5' 1" (1.549 m)     BMI: Body mass index is 27.95 kg/m².    Physical Exam  Constitutional:       General: She is not in acute distress.  Cardiovascular:      Rate and Rhythm: Normal rate and regular rhythm.      Pulses: Normal pulses.      Heart sounds: No murmur heard.  Pulmonary:      Effort: Pulmonary effort is normal. No respiratory distress.      Breath sounds: Normal breath sounds.   Lymphadenopathy:      Cervical: No cervical adenopathy.      Upper Body:      Right upper body: No supraclavicular or axillary adenopathy.      Left upper body: No supraclavicular or axillary adenopathy.   Skin:     Capillary Refill: Capillary refill takes less than 2 seconds.      Coloration: Skin is not jaundiced or pale.      Findings: No bruising.   Neurological:      General: No focal deficit present.      Mental Status: She is alert and oriented to person, place, and time.         Laboratory Data:  Labs have been reviewed.    Imaging:   3/24/2025 US ABDOMEN COMPLETE     CLINICAL HISTORY:  Abdominal distension (gaseous)     TECHNIQUE:  Complete abdominal ultrasound (including pancreas, aorta, liver, gallbladder, common bile duct, IVC, kidneys, and spleen) was performed.     COMPARISON:  None     FINDINGS:  Pancreas: The visualized portions of pancreas appear normal.     Aorta: No aneurysm.     Liver: 13.7 cm in length, normal in size. Homogeneous parenchymal echotexture. No focal lesions.     Gallbladder: No calculi, wall thickening, or pericholecystic fluid.  Negative sonographic Dietz's sign.     Biliary system: 3 a mm common bile duct.  No intrahepatic ductal dilatation.     Inferior vena cava: Normal in appearance.     Right kidney: 9.3 x 4.4 x 5.3 cm. No " hydronephrosis.     Left kidney: 9.1 x 5.8 x 5.5 cm. No hydronephrosis.     Spleen: 8.1 x 3.8 cm.  Normal in size with homogeneous echotexture.     Miscellaneous: No ascites.     Impression:     No significant abnormality.  Assessment:       1. Sickle cell trait    2. Vitamin D insufficiency    3. Weakness    4. Fatigue, unspecified type    5. Dyspnea, unspecified type    6. Dysphagia, unspecified type    7. Irregular menses      Plan:     Sickle Cell Trait   - Sickle Cell trait occurs when a person inherits one sickle cell gene and one normal gene. It is NOT the same as sickle cell disease. Most people with SCT do not have any symptoms. However, they can pass this gene to their children.   - Most people with sickle cell trait live normal, healthy lives. However, under extreme conditions complications can occur. Extreme conditions such as severe dehydration, adrianne intensity exercise (especially at high altitudes or in hot weather), and low oxygen levels can lead to rare complications including blood in urine and kidney dysfunction. It is important to stay hydrated, especially when exercising and avoid excessive exertion in extreme conditions.   - It is important to know the sickle cell trait status of your future partner when Beti wishes to have children. If one parent has sickle cell trait there is a 50% chance of any conceived children having this carrier state. If both partners have sickle cell trait there is a 25% of having a child with sickle cell disease, 50% chance of having a child with sickle cell trait, and 25% of having a child with neither.   - I discussed that her symptoms of weakness, fatigue, and joint pain (which seem to be chronic) are likely not due to her sickle cell trait. If symptoms from SCT occur they are usually acute due to extreme circumstances as listed above.     2. Vitamin D insufficiency  - level checked in March was low at 13  - Started on 50,000 units weekly. Recommend taking this  for 3 months and then rechecking level. If normalized can switch to 2000 units daily for maintenance.   - Vitamin D insufficiency/deficiency can contribute to muscle weakness or cramps, bone pain, joint pain or stiffness, generalized fatigue, sweating, unexplained weight gain, poor sleep, irritability, depression, etc.     3. Fatigue/Weakness/Dyspnea   - Vitamin D insufficiency can certainly contribute to some of these symptoms  - Will also check vitamin folate, B1, B6, and b12 levels   - Orthostatic blood pressures taken:    Laying down: 122/76, pulse 88   Sittin/80, pulse 95   Standin/77, pulse 100   - Does not meet criteria for orthostatic hypotension.  - Since she is continuing to have intermittent chest pain and TANG will order EKG. Consider cardiology referral for symptoms since workup has been thusfar negative   - ZAYRA and RF negative  - HIV/Hepatitis negative  - TSH normal  - Continue to monitor     4. Irregular Menses  - She has a mildly low iron saturation, normal ferritin, normal serum iron, and normal hemoglobin.   - Okay to start ferrous sulfate qOD for maintenance in this menstruating female. Rx sent.   - She started OCPs two weeks ago  - Will monitor     5. Dysphagia   - Reports this since 2024  - She showed a picture of throat from yesterday evening which looked like there was a tonsil stone on left tonsil, but it is not there today. No erythema.   - We discussed that tonsil stones are small, hard, whitish-yellow lumops that form in the crevice of tonsils. Likely from dead cells, bacteria, food debris, or mucus. They can cause bad breath, sore throat, or feeling like something is stuck. It is not there today, so it may have dislodged as they often do on their own.   - Prevention includes good oral hygiene and adequate water intake  - Referral to ENT placed at parent and patient request to further evaluate dysphagia.     Labs today. Call with results and follow up.     Angeles DALY  RADHA Martini   Hematology/Oncology  [facility]  [address], [address]  [place] [address]  Phone: [phone]  Fax: [phone]         [1]   Current Outpatient Medications   Medication Sig Dispense Refill    ergocalciferol (ERGOCALCIFEROL) 50,000 unit Cap Take 1 capsule (50,000 Units total) by mouth every 7 days. for 8 doses 4 capsule 1    norgestimate-ethinyl estradioL (SPRINTEC, 28,) 0.25-35 mg-mcg per tablet Take 1 tablet by mouth once daily. 30 tablet 11    triamcinolone acetonide 0.025% (KENALOG) 0.025 % cream Apply topically 2 (two) times daily as needed (dry skin on body). Apply to affected area. Do not apply to face and armpits. External use only. 80 g 1     No current facility-administered medications for this visit.

## 2025-04-23 ENCOUNTER — RESULTS FOLLOW-UP (OUTPATIENT)
Dept: HEMATOLOGY/ONCOLOGY | Facility: CLINIC | Age: 20
End: 2025-04-23

## 2025-04-23 LAB
OHS QRS DURATION: 82 MS
OHS QTC CALCULATION: 432 MS

## 2025-04-28 LAB — W VITAMIN B6: 8 UG/L

## 2025-05-05 DIAGNOSIS — E53.8 VITAMIN B12 DEFICIENCY: ICD-10-CM

## 2025-05-05 DIAGNOSIS — E53.1 VITAMIN B6 DEFICIENCY: ICD-10-CM

## 2025-05-05 DIAGNOSIS — E55.9 VITAMIN D INSUFFICIENCY: Primary | ICD-10-CM

## 2025-06-12 ENCOUNTER — OFFICE VISIT (OUTPATIENT)
Dept: OTOLARYNGOLOGY | Facility: CLINIC | Age: 20
End: 2025-06-12
Payer: MEDICAID

## 2025-06-12 VITALS
DIASTOLIC BLOOD PRESSURE: 76 MMHG | WEIGHT: 145 LBS | SYSTOLIC BLOOD PRESSURE: 122 MMHG | BODY MASS INDEX: 27.38 KG/M2 | HEIGHT: 61 IN | HEART RATE: 100 BPM

## 2025-06-12 DIAGNOSIS — J35.8 TONSILLOLITH: ICD-10-CM

## 2025-06-12 PROCEDURE — 1159F MED LIST DOCD IN RCRD: CPT | Mod: CPTII,S$GLB,, | Performed by: STUDENT IN AN ORGANIZED HEALTH CARE EDUCATION/TRAINING PROGRAM

## 2025-06-12 PROCEDURE — 3008F BODY MASS INDEX DOCD: CPT | Mod: CPTII,S$GLB,, | Performed by: STUDENT IN AN ORGANIZED HEALTH CARE EDUCATION/TRAINING PROGRAM

## 2025-06-12 PROCEDURE — 3074F SYST BP LT 130 MM HG: CPT | Mod: CPTII,S$GLB,, | Performed by: STUDENT IN AN ORGANIZED HEALTH CARE EDUCATION/TRAINING PROGRAM

## 2025-06-12 PROCEDURE — 99204 OFFICE O/P NEW MOD 45 MIN: CPT | Mod: S$GLB,,, | Performed by: STUDENT IN AN ORGANIZED HEALTH CARE EDUCATION/TRAINING PROGRAM

## 2025-06-12 PROCEDURE — 3078F DIAST BP <80 MM HG: CPT | Mod: CPTII,S$GLB,, | Performed by: STUDENT IN AN ORGANIZED HEALTH CARE EDUCATION/TRAINING PROGRAM

## 2025-06-12 PROCEDURE — 3044F HG A1C LEVEL LT 7.0%: CPT | Mod: CPTII,S$GLB,, | Performed by: STUDENT IN AN ORGANIZED HEALTH CARE EDUCATION/TRAINING PROGRAM

## 2025-06-12 NOTE — PROGRESS NOTES
Ear, Nose, & Throat  Otolaryngology - Head & Neck Surgery      Subjective:     Chief Complaint: Tonsil Stones    Beti Moser is a 19 y.o. female who was referred to me by Angeles Martini in consultation for tonsil stones.    Reports about a 1 year history of troublesome tonsil stones most often in the left side.  She is self expressed these in the past.  States she feels pinpoint pain when swallowing on that side.  No true dysphagia. No regurgitation. No change in voice or breathing.    She reports 4-5 throat infections over the past year. She has not been treated with antibiotics in the past year; however, she recalls to responding to Augmentin prior to this.     She is not on anticoagulation. She has a history of sickle trait, eczema, anxiety and PCOS.     Past Medical History  Active Ambulatory Problems     Diagnosis Date Noted    Sickle cell trait 05/13/2015    Social anxiety disorder 03/23/2016    Macromastia 02/06/2024    Eczema 09/30/2024    Other fatigue 03/25/2025     Resolved Ambulatory Problems     Diagnosis Date Noted    Frequent headaches 05/07/2015    Screening for sickle-cell disease or trait 05/07/2015    Recurrent fever 09/15/2015     No Additional Past Medical History       Past Surgical History  She has a past surgical history that includes Reduction of both breasts (Bilateral, 2/6/2024).    Past Surgical History:   Procedure Laterality Date    REDUCTION OF BOTH BREASTS Bilateral 2/6/2024    Procedure: MAMMOPLASTY, REDUCTION, BILATERAL;  Surgeon: Chad Hernandez MD;  Location: [facility] OR [address] [place] [address];  Service: Plastics;  Laterality: Bilateral;        Family History  Her family history includes Cancer in her maternal grandfather; Colon cancer in her paternal grandfather; Migraines in her father and maternal grandmother.    Social History  She reports that she has never smoked. She has never used smokeless tobacco. She reports that she does not drink alcohol and does not use  "drugs.    Allergies  She has no known allergies.    Medications  She has a current medication list which includes the following prescription(s): ergocalciferol, ferrous sulfate, norgestimate-ethinyl estradiol, and triamcinolone acetonide 0.025%.    ROS:  Pertinent positive and negative review of systems as noted in HPI.     Objective:     /76 (BP Location: Left arm, Patient Position: Sitting)   Pulse 100   Ht 5' 1" (1.549 m)   Wt 65.8 kg (145 lb)   BMI 27.40 kg/m²    Physical Exam  Constitutional: Well appearing, communicating. No acute distress  Voice: Euphonic  Head/Face: House Brackmann I Bilaterally.  Oropharynx:    Tonsils symmetric, 2+, cryptic, tonsil stones on the left   Burciaga Tongue Position: 3 (partial obstruction soft palate)  Neck/Lymphatic:    No masses or lesions   Trachea midline   Neck Circumference  Neuro/Psychiatric:     Affect: Appropriate   Eyes: EOMI intact  Respiratory:   No increased WOB, no stridor     Data Review:   MEDICAL RECORDS    I have reviewed the following medical records relevant to the care of this patient from unique sources outside of my institution or departmental specialty:  Primary Care and Hematology    LABS  Hemoglobin (g/dL)   Date Value   10/25/2024 13.9     HGB (gm/dL)   Date Value   03/25/2025 14.9     WBC (K/uL)   Date Value   03/25/2025 6.07     Platelet Count (K/uL)   Date Value   03/25/2025 342     Platelets (K/uL)   Date Value   10/25/2024 336     Creatinine (mg/dL)   Date Value   03/25/2025 0.7     Calcium (mg/dL)   Date Value   03/25/2025 9.7   08/16/2018 9.9     Lab Results   Component Value Date/Time    RAPSCRN Negative 09/30/2024 04:57 PM    STREPACULT No  Group A  Streptococcus isolated 06/06/2017 03:09 PM       I have independently reviewed the lab results shown above. Findings significant for the conditions being treated include: No anemia, all WNL, previous negative strep    IMAGING    No pertinent imaging available    AUDIO    not " performed    Procedures:       Assessment:     1. Tonsillolith      Plan:     After speaking to and examining the patient, I believe they meet criteria for tonsillectomy.  We discussed that the patient has 2 fundamental options: Observation with continued and intermittent antibiotics versus tonsillectomy.  I described tonsillectomy as an outpatient procedure in which the tonsils are removed through the mouth while under general anesthesia. I discussed that the patient would likely need 1-2 weeks of light activity to recover and may need to miss work/school during this time. I also discussed that most patient require narcotic pain medication and that would not be safe to drive while operating a vehicle or heavy machinery. I then discussed the risks, benefits, indications, and alternatives to tonsillectomy.  The risks were described to include, but not be limited to, infection, bleeding, scarring, failure to eliminate sore throats, and the need for additional procedures.  There is data that suggests that sore throats may occur less frequently or be less severe, although no warranties or guarantees were made or implied.  There is an increased risk of bleeding within the first 6 hours of surgery which returns between 5 and 14 days after surgery when the scabs resolve.  The patient was encouraged to drink lots of liquids, eat soft foods, and avoid sharp edged foods such as chips.  Time was allowed for questions, and all questions were answered to the patient's apparent satisfaction. The patient showed good understanding of their condition and wishes to proceed with surgery.       Voice recognition software was used in the creation of this note/communication and any sound-alike errors which may have occurred from its use should be taken in context when interpreting. If such errors prevent a clear understanding of the note/communication, please contact the office for clarification.     No orders of the defined types were  placed in this encounter.

## 2025-06-15 DIAGNOSIS — J35.8 TONSILLOLITH: Primary | ICD-10-CM

## 2025-06-23 ENCOUNTER — ANESTHESIA EVENT (OUTPATIENT)
Dept: SURGERY | Facility: OTHER | Age: 20
End: 2025-06-23
Payer: MEDICAID

## 2025-06-28 ENCOUNTER — PATIENT MESSAGE (OUTPATIENT)
Dept: OTOLARYNGOLOGY | Facility: CLINIC | Age: 20
End: 2025-06-28
Payer: MEDICAID

## 2025-06-30 ENCOUNTER — PATIENT MESSAGE (OUTPATIENT)
Dept: PREADMISSION TESTING | Facility: OTHER | Age: 20
End: 2025-06-30
Payer: MEDICAID

## 2025-06-30 RX ORDER — ESCITALOPRAM OXALATE 10 MG/1
10 TABLET ORAL DAILY
COMMUNITY
Start: 2025-06-02

## 2025-06-30 NOTE — PRE ADMISSION SCREENING
Information to Prepare you for your Surgery    PRE-ADMIT TESTING & SURGERY  [address] [facility] BUILDING  ENTRANCE 2       Your surgery has been scheduled at [facility]. We are pleased to have the opportunity to serve you. For Further Information please call [phone].    On the day of surgery please report to the Registration Desk on the 1st floor of the [facility]. This is a 4 story red brick building on the corner of [facility]. Turn onto [address]  to access the parking lot behind the [facility] at the corner of [facility].    CONTACT YOUR PHYSICIAN'S OFFICE THE DAY PRIOR TO YOUR SURGERY TO OBTAIN YOUR ARRIVAL TIME.     The evening before surgery do not eat anything after 9 p.m. ( this includes hard candy, chewing gum and mints).  You may only have GATORADE, POWERADE AND WATER  from 9 p.m. until you leave your home.   DO NOT DRINK ANY LIQUIDS ON THE WAY TO THE HOSPITAL.      Why does your anesthesiologist allow you to drink Gatorade/Powerade before surgery?  Gatorade/Powerade helps to increase your comfort before surgery and to decrease your nausea after surgery.   The carbohydrates in Gatorade/Powerade help reduce your body's stress response to surgery.    If you are a diabetic-drink only water prior to surgery.    Outpatient Surgery- May allow 2 adults (18 and older)/ Support Persons (1 being the designated ) for all surgical/procedural patients. A breastfeeding mother will be allowed her infant and 2 adult Support Persons. No one under the age of 18 will be allowed in the building.    MEDICATION INSTRUCTIONS:     Surgery Patients:  If you take ASPIRIN - Your PHYSICIAN/SURGEON will need to inform you IF/OR when you need to stop taking aspirin prior to your surgery.     Starting the week prior to surgery, do not take any medications containing IBUPROFEN or NSAIDS (Advil, Aleve, BC, Goody's, Ketorolac, Meloxicam, Mobic, Motrin, Naproxen,  Toradol, etc).  If you are not sure if you should take a medicine, please call your surgeon's office.  You may take Tylenol.    Do Not Wear any make-up (especially eye make-up) to surgery. Please remove any false eyelashes or eyelash extensions. If you arrive the day of surgery with makeup/eyelashes on you will be required to remove prior to surgery. (There is a risk of corneal abrasions if eye makeup/eyelash extensions are not removed)    Leave all valuables at home.   Do Not wear any jewelry or watches, including any metal in body piercings. Jewelry must be removed prior to coming to the hospital.  There is a possibility that rings that are unable to be removed may be cut off if they are on the surgical extremity.    Please remove all hair extensions, wigs, clips and any other metal accessories/ ornaments from your hair.  These items may pose a flammable/fire risk in Surgery and must be removed.    Do not shave your surgical area at least 5 days prior to your surgery. The surgical prep will be performed at the hospital according to Infection Control regulations.    Contact Lens must be removed before surgery. Either do not wear the contact lens or bring a case and solution for storage.  Please bring a container for eyeglasses or dentures as required.  Bring any paperwork your physician has provided, such as consent forms,  history and physicals, doctor's orders, etc.   Bring comfortable clothes that are loose fitting to wear upon discharge. Take into consideration the type of surgery being performed.  Maintain your diet as advised per your physician the day prior to surgery.    Adequate rest the night before surgery is advised.   Park in the Parking lot behind the hospital or in the [facility] across the street from the parking lot. Parking is complimentary.  If you will be discharged the same day as your procedure, please arrange for a responsible adult to drive you home or to accompany you if  traveling by taxi.   YOU WILL NOT BE PERMITTED TO DRIVE OR TO LEAVE THE HOSPITAL ALONE AFTER SURGERY.   If you are being discharged the same day, it is strongly recommended that you arrange for someone to remain with you for the first 24 hrs following your surgery.    The Surgeon will speak to your family/visitor after your surgery regarding the outcome of your surgery and post op care.  The Surgeon may speak to you after your surgery, but there is a possibility you may not remember the details.  Please check with your family members regarding the conversation with the Surgeon.    We strongly recommend whoever is bringing you home be present for discharge instructions.  This will ensure a thorough understanding for your post op home care.    If the patient has fever, cough, or signs/symptoms of Flu or Covid please do not come in for your surgery.   Contact your surgeon and your primary care physician for further instructions.               Bathing Instructions  Shower (no bath) the evening before and the morning of your procedure with antibacterial soap.  Wash your face with water and your regular face wash/soap  Use your regular shampoo  Dry off as usual Do not use any deodorant, powder, body lotions, perfume, after shave or cologne.

## 2025-07-03 ENCOUNTER — LAB VISIT (OUTPATIENT)
Dept: LAB | Facility: HOSPITAL | Age: 20
End: 2025-07-03
Attending: PHYSICIAN ASSISTANT
Payer: MEDICAID

## 2025-07-03 DIAGNOSIS — E53.1 VITAMIN B6 DEFICIENCY: ICD-10-CM

## 2025-07-03 DIAGNOSIS — E55.9 VITAMIN D INSUFFICIENCY: ICD-10-CM

## 2025-07-03 DIAGNOSIS — E53.8 VITAMIN B12 DEFICIENCY: ICD-10-CM

## 2025-07-03 LAB
ABSOLUTE EOSINOPHIL (OHS): 0.03 K/UL
ABSOLUTE MONOCYTE (OHS): 0.44 K/UL (ref 0.3–1)
ABSOLUTE NEUTROPHIL COUNT (OHS): 4.54 K/UL (ref 1.8–7.7)
ALBUMIN SERPL BCP-MCNC: 4.3 G/DL (ref 3.5–5.2)
ALP SERPL-CCNC: 69 UNIT/L (ref 40–150)
ALT SERPL W/O P-5'-P-CCNC: 12 UNIT/L (ref 10–44)
ANION GAP (OHS): 10 MMOL/L (ref 8–16)
AST SERPL-CCNC: 16 UNIT/L (ref 11–45)
BASOPHILS # BLD AUTO: 0.02 K/UL
BASOPHILS NFR BLD AUTO: 0.3 %
BILIRUB SERPL-MCNC: 0.5 MG/DL (ref 0.1–1)
BUN SERPL-MCNC: 9 MG/DL (ref 6–20)
CALCIUM SERPL-MCNC: 9.5 MG/DL (ref 8.7–10.5)
CHLORIDE SERPL-SCNC: 105 MMOL/L (ref 95–110)
CO2 SERPL-SCNC: 24 MMOL/L (ref 23–29)
CREAT SERPL-MCNC: 0.7 MG/DL (ref 0.5–1.4)
ERYTHROCYTE [DISTWIDTH] IN BLOOD BY AUTOMATED COUNT: 12.5 % (ref 11.5–14.5)
GFR SERPLBLD CREATININE-BSD FMLA CKD-EPI: >60 ML/MIN/1.73/M2
GLUCOSE SERPL-MCNC: 82 MG/DL (ref 70–110)
HCT VFR BLD AUTO: 42.4 % (ref 37–48.5)
HGB BLD-MCNC: 14.4 GM/DL (ref 12–16)
IMM GRANULOCYTES # BLD AUTO: 0.02 K/UL (ref 0–0.04)
IMM GRANULOCYTES NFR BLD AUTO: 0.3 % (ref 0–0.5)
LYMPHOCYTES # BLD AUTO: 2.18 K/UL (ref 1–4.8)
MCH RBC QN AUTO: 30.4 PG (ref 27–31)
MCHC RBC AUTO-ENTMCNC: 34 G/DL (ref 32–36)
MCV RBC AUTO: 90 FL (ref 82–98)
NUCLEATED RBC (/100WBC) (OHS): 0 /100 WBC
PLATELET # BLD AUTO: 298 K/UL (ref 150–450)
PMV BLD AUTO: 9.9 FL (ref 9.2–12.9)
POTASSIUM SERPL-SCNC: 4.4 MMOL/L (ref 3.5–5.1)
PROT SERPL-MCNC: 8.3 GM/DL (ref 6–8.4)
RBC # BLD AUTO: 4.74 M/UL (ref 4–5.4)
RELATIVE EOSINOPHIL (OHS): 0.4 %
RELATIVE LYMPHOCYTE (OHS): 30.2 % (ref 18–48)
RELATIVE MONOCYTE (OHS): 6.1 % (ref 4–15)
RELATIVE NEUTROPHIL (OHS): 62.7 % (ref 38–73)
SODIUM SERPL-SCNC: 139 MMOL/L (ref 136–145)
WBC # BLD AUTO: 7.23 K/UL (ref 3.9–12.7)

## 2025-07-03 PROCEDURE — 80053 COMPREHEN METABOLIC PANEL: CPT

## 2025-07-03 PROCEDURE — 82306 VITAMIN D 25 HYDROXY: CPT

## 2025-07-03 PROCEDURE — 82607 VITAMIN B-12: CPT

## 2025-07-03 PROCEDURE — 84207 ASSAY OF VITAMIN B-6: CPT

## 2025-07-03 PROCEDURE — 85025 COMPLETE CBC W/AUTO DIFF WBC: CPT

## 2025-07-03 PROCEDURE — 36415 COLL VENOUS BLD VENIPUNCTURE: CPT

## 2025-07-04 ENCOUNTER — PATIENT MESSAGE (OUTPATIENT)
Dept: OTOLARYNGOLOGY | Facility: CLINIC | Age: 20
End: 2025-07-04
Payer: MEDICAID

## 2025-07-04 LAB
25(OH)D3+25(OH)D2 SERPL-MCNC: 18 NG/ML (ref 30–96)
VIT B12 SERPL-MCNC: 435 PG/ML (ref 210–950)

## 2025-07-06 RX ORDER — DEXAMETHASONE SODIUM PHOSPHATE 4 MG/ML
8 INJECTION, SOLUTION INTRA-ARTICULAR; INTRALESIONAL; INTRAMUSCULAR; INTRAVENOUS; SOFT TISSUE
OUTPATIENT
Start: 2025-07-06

## 2025-07-07 ENCOUNTER — HOSPITAL ENCOUNTER (OUTPATIENT)
Facility: OTHER | Age: 20
Discharge: HOME | End: 2025-07-07
Attending: STUDENT IN AN ORGANIZED HEALTH CARE EDUCATION/TRAINING PROGRAM | Admitting: STUDENT IN AN ORGANIZED HEALTH CARE EDUCATION/TRAINING PROGRAM
Payer: MEDICAID

## 2025-07-07 ENCOUNTER — ANESTHESIA (OUTPATIENT)
Dept: SURGERY | Facility: OTHER | Age: 20
End: 2025-07-07
Payer: MEDICAID

## 2025-07-07 DIAGNOSIS — J35.8 TONSILLOLITH: Primary | ICD-10-CM

## 2025-07-07 DIAGNOSIS — Z01.818 PRE-OP TESTING: ICD-10-CM

## 2025-07-07 DIAGNOSIS — J35.1 TONSILLAR HYPERTROPHY: ICD-10-CM

## 2025-07-07 LAB
ABSOLUTE EOSINOPHIL (OHS): 0.04 K/UL
ABSOLUTE MONOCYTE (OHS): 0.83 K/UL (ref 0.3–1)
ABSOLUTE NEUTROPHIL COUNT (OHS): 6.67 K/UL (ref 1.8–7.7)
ALBUMIN SERPL BCP-MCNC: 3.9 G/DL (ref 3.5–5.2)
ALP SERPL-CCNC: 61 UNIT/L (ref 40–150)
ALT SERPL W/O P-5'-P-CCNC: 15 UNIT/L (ref 10–44)
ANION GAP (OHS): 13 MMOL/L (ref 8–16)
AST SERPL-CCNC: 14 UNIT/L (ref 11–45)
B-HCG UR QL: NEGATIVE
BASOPHILS # BLD AUTO: 0.02 K/UL
BASOPHILS NFR BLD AUTO: 0.2 %
BILIRUB SERPL-MCNC: 0.3 MG/DL (ref 0.1–1)
BUN SERPL-MCNC: 11 MG/DL (ref 6–20)
CALCIUM SERPL-MCNC: 8.9 MG/DL (ref 8.7–10.5)
CHLORIDE SERPL-SCNC: 104 MMOL/L (ref 95–110)
CO2 SERPL-SCNC: 21 MMOL/L (ref 23–29)
CREAT SERPL-MCNC: 0.8 MG/DL (ref 0.5–1.4)
CTP QC/QA: YES
ERYTHROCYTE [DISTWIDTH] IN BLOOD BY AUTOMATED COUNT: 12.5 % (ref 11.5–14.5)
GFR SERPLBLD CREATININE-BSD FMLA CKD-EPI: >60 ML/MIN/1.73/M2
GLUCOSE SERPL-MCNC: 89 MG/DL (ref 70–110)
HCT VFR BLD AUTO: 39.8 % (ref 37–48.5)
HGB BLD-MCNC: 13.3 GM/DL (ref 12–16)
IMM GRANULOCYTES # BLD AUTO: 0.04 K/UL (ref 0–0.04)
IMM GRANULOCYTES NFR BLD AUTO: 0.4 % (ref 0–0.5)
LYMPHOCYTES # BLD AUTO: 3.16 K/UL (ref 1–4.8)
MCH RBC QN AUTO: 29.9 PG (ref 27–31)
MCHC RBC AUTO-ENTMCNC: 33.4 G/DL (ref 32–36)
MCV RBC AUTO: 89 FL (ref 82–98)
NUCLEATED RBC (/100WBC) (OHS): 0 /100 WBC
PLATELET # BLD AUTO: 298 K/UL (ref 150–450)
PMV BLD AUTO: 9.5 FL (ref 9.2–12.9)
POTASSIUM SERPL-SCNC: 3.5 MMOL/L (ref 3.5–5.1)
PROT SERPL-MCNC: 7.5 GM/DL (ref 6–8.4)
RBC # BLD AUTO: 4.45 M/UL (ref 4–5.4)
RELATIVE EOSINOPHIL (OHS): 0.4 %
RELATIVE LYMPHOCYTE (OHS): 29.4 % (ref 18–48)
RELATIVE MONOCYTE (OHS): 7.7 % (ref 4–15)
RELATIVE NEUTROPHIL (OHS): 61.9 % (ref 38–73)
SODIUM SERPL-SCNC: 138 MMOL/L (ref 136–145)
WBC # BLD AUTO: 10.76 K/UL (ref 3.9–12.7)

## 2025-07-07 PROCEDURE — 71000015 HC POSTOP RECOV 1ST HR: Performed by: STUDENT IN AN ORGANIZED HEALTH CARE EDUCATION/TRAINING PROGRAM

## 2025-07-07 PROCEDURE — 25000003 PHARM REV CODE 250: Performed by: STUDENT IN AN ORGANIZED HEALTH CARE EDUCATION/TRAINING PROGRAM

## 2025-07-07 PROCEDURE — 36000707: Performed by: STUDENT IN AN ORGANIZED HEALTH CARE EDUCATION/TRAINING PROGRAM

## 2025-07-07 PROCEDURE — 25000003 PHARM REV CODE 250

## 2025-07-07 PROCEDURE — 80053 COMPREHEN METABOLIC PANEL: CPT

## 2025-07-07 PROCEDURE — 63600175 PHARM REV CODE 636 W HCPCS

## 2025-07-07 PROCEDURE — 71000033 HC RECOVERY, INTIAL HOUR: Performed by: STUDENT IN AN ORGANIZED HEALTH CARE EDUCATION/TRAINING PROGRAM

## 2025-07-07 PROCEDURE — 25000003 PHARM REV CODE 250: Performed by: ANESTHESIOLOGY

## 2025-07-07 PROCEDURE — 71000039 HC RECOVERY, EACH ADD'L HOUR: Performed by: STUDENT IN AN ORGANIZED HEALTH CARE EDUCATION/TRAINING PROGRAM

## 2025-07-07 PROCEDURE — 37000009 HC ANESTHESIA EA ADD 15 MINS: Performed by: STUDENT IN AN ORGANIZED HEALTH CARE EDUCATION/TRAINING PROGRAM

## 2025-07-07 PROCEDURE — 36000706: Performed by: STUDENT IN AN ORGANIZED HEALTH CARE EDUCATION/TRAINING PROGRAM

## 2025-07-07 PROCEDURE — 88304 TISSUE EXAM BY PATHOLOGIST: CPT | Mod: TC | Performed by: STUDENT IN AN ORGANIZED HEALTH CARE EDUCATION/TRAINING PROGRAM

## 2025-07-07 PROCEDURE — 81025 URINE PREGNANCY TEST: CPT

## 2025-07-07 PROCEDURE — 85025 COMPLETE CBC W/AUTO DIFF WBC: CPT

## 2025-07-07 PROCEDURE — 36415 COLL VENOUS BLD VENIPUNCTURE: CPT

## 2025-07-07 PROCEDURE — 71000016 HC POSTOP RECOV ADDL HR: Performed by: STUDENT IN AN ORGANIZED HEALTH CARE EDUCATION/TRAINING PROGRAM

## 2025-07-07 PROCEDURE — 88304 TISSUE EXAM BY PATHOLOGIST: CPT | Mod: 26,,, | Performed by: STUDENT IN AN ORGANIZED HEALTH CARE EDUCATION/TRAINING PROGRAM

## 2025-07-07 PROCEDURE — 42826 REMOVAL OF TONSILS: CPT | Mod: ,,, | Performed by: STUDENT IN AN ORGANIZED HEALTH CARE EDUCATION/TRAINING PROGRAM

## 2025-07-07 PROCEDURE — 37000008 HC ANESTHESIA 1ST 15 MINUTES: Performed by: STUDENT IN AN ORGANIZED HEALTH CARE EDUCATION/TRAINING PROGRAM

## 2025-07-07 RX ORDER — FENTANYL CITRATE 50 UG/ML
INJECTION, SOLUTION INTRAMUSCULAR; INTRAVENOUS
Status: DISCONTINUED | OUTPATIENT
Start: 2025-07-07 | End: 2025-07-07

## 2025-07-07 RX ORDER — MIDAZOLAM HYDROCHLORIDE 1 MG/ML
INJECTION INTRAMUSCULAR; INTRAVENOUS
Status: DISCONTINUED | OUTPATIENT
Start: 2025-07-07 | End: 2025-07-07

## 2025-07-07 RX ORDER — LIDOCAINE HYDROCHLORIDE 20 MG/ML
INJECTION INTRAVENOUS
Status: DISCONTINUED | OUTPATIENT
Start: 2025-07-07 | End: 2025-07-07

## 2025-07-07 RX ORDER — OXYCODONE HYDROCHLORIDE 5 MG/1
5 TABLET ORAL
Status: DISCONTINUED | OUTPATIENT
Start: 2025-07-07 | End: 2025-07-07 | Stop reason: HOSPADM

## 2025-07-07 RX ORDER — OXYMETAZOLINE HCL 0.05 %
SPRAY, NON-AEROSOL (ML) NASAL
Status: DISCONTINUED | OUTPATIENT
Start: 2025-07-07 | End: 2025-07-07 | Stop reason: HOSPADM

## 2025-07-07 RX ORDER — DEXMEDETOMIDINE HYDROCHLORIDE 100 UG/ML
INJECTION, SOLUTION INTRAVENOUS
Status: DISCONTINUED | OUTPATIENT
Start: 2025-07-07 | End: 2025-07-07

## 2025-07-07 RX ORDER — ONDANSETRON HYDROCHLORIDE 2 MG/ML
INJECTION, SOLUTION INTRAVENOUS
Status: DISCONTINUED | OUTPATIENT
Start: 2025-07-07 | End: 2025-07-07

## 2025-07-07 RX ORDER — ROCURONIUM BROMIDE 10 MG/ML
INJECTION, SOLUTION INTRAVENOUS
Status: DISCONTINUED | OUTPATIENT
Start: 2025-07-07 | End: 2025-07-07

## 2025-07-07 RX ORDER — LIDOCAINE HYDROCHLORIDE 10 MG/ML
1 INJECTION, SOLUTION EPIDURAL; INFILTRATION; INTRACAUDAL; PERINEURAL ONCE
Status: DISCONTINUED | OUTPATIENT
Start: 2025-07-07 | End: 2025-07-07 | Stop reason: HOSPADM

## 2025-07-07 RX ORDER — DEXAMETHASONE 4 MG/1
8 TABLET ORAL
Qty: 10 TABLET | Refills: 0 | Status: SHIPPED | OUTPATIENT
Start: 2025-07-07 | End: 2025-07-17

## 2025-07-07 RX ORDER — PROCHLORPERAZINE EDISYLATE 5 MG/ML
5 INJECTION INTRAMUSCULAR; INTRAVENOUS EVERY 30 MIN PRN
Status: DISCONTINUED | OUTPATIENT
Start: 2025-07-07 | End: 2025-07-07 | Stop reason: HOSPADM

## 2025-07-07 RX ORDER — SODIUM CHLORIDE 0.9 % (FLUSH) 0.9 %
2 SYRINGE (ML) INJECTION
Status: DISCONTINUED | OUTPATIENT
Start: 2025-07-07 | End: 2025-07-07 | Stop reason: HOSPADM

## 2025-07-07 RX ORDER — ONDANSETRON 4 MG/1
4 TABLET, ORALLY DISINTEGRATING ORAL EVERY 6 HOURS PRN
Qty: 30 TABLET | Refills: 0 | Status: SHIPPED | OUTPATIENT
Start: 2025-07-07

## 2025-07-07 RX ORDER — SODIUM CHLORIDE 0.9 % (FLUSH) 0.9 %
3 SYRINGE (ML) INJECTION
Status: DISCONTINUED | OUTPATIENT
Start: 2025-07-07 | End: 2025-07-07 | Stop reason: HOSPADM

## 2025-07-07 RX ORDER — PROPOFOL 10 MG/ML
VIAL (ML) INTRAVENOUS
Status: DISCONTINUED | OUTPATIENT
Start: 2025-07-07 | End: 2025-07-07

## 2025-07-07 RX ORDER — OXYMETAZOLINE HCL 0.05 %
3 SPRAY, NON-AEROSOL (ML) NASAL ONCE
Status: DISCONTINUED | OUTPATIENT
Start: 2025-07-07 | End: 2025-07-07 | Stop reason: HOSPADM

## 2025-07-07 RX ORDER — DEXAMETHASONE SODIUM PHOSPHATE 4 MG/ML
INJECTION, SOLUTION INTRA-ARTICULAR; INTRALESIONAL; INTRAMUSCULAR; INTRAVENOUS; SOFT TISSUE
Status: DISCONTINUED | OUTPATIENT
Start: 2025-07-07 | End: 2025-07-07

## 2025-07-07 RX ORDER — GLUCAGON 1 MG
1 KIT INJECTION
Status: DISCONTINUED | OUTPATIENT
Start: 2025-07-07 | End: 2025-07-07 | Stop reason: HOSPADM

## 2025-07-07 RX ORDER — HYDROMORPHONE HYDROCHLORIDE 2 MG/ML
0.4 INJECTION, SOLUTION INTRAMUSCULAR; INTRAVENOUS; SUBCUTANEOUS EVERY 5 MIN PRN
Status: DISCONTINUED | OUTPATIENT
Start: 2025-07-07 | End: 2025-07-07 | Stop reason: HOSPADM

## 2025-07-07 RX ORDER — SODIUM CHLORIDE, SODIUM LACTATE, POTASSIUM CHLORIDE, CALCIUM CHLORIDE 600; 310; 30; 20 MG/100ML; MG/100ML; MG/100ML; MG/100ML
INJECTION, SOLUTION INTRAVENOUS CONTINUOUS
Status: DISCONTINUED | OUTPATIENT
Start: 2025-07-07 | End: 2025-07-07 | Stop reason: HOSPADM

## 2025-07-07 RX ORDER — CELECOXIB 200 MG/1
200 CAPSULE ORAL 2 TIMES DAILY
Qty: 28 CAPSULE | Refills: 0 | Status: SHIPPED | OUTPATIENT
Start: 2025-07-07

## 2025-07-07 RX ORDER — LIDOCAINE HYDROCHLORIDE 10 MG/ML
0.5 INJECTION, SOLUTION EPIDURAL; INFILTRATION; INTRACAUDAL; PERINEURAL ONCE
Status: DISCONTINUED | OUTPATIENT
Start: 2025-07-07 | End: 2025-07-07 | Stop reason: HOSPADM

## 2025-07-07 RX ORDER — HYDROCODONE BITARTRATE AND ACETAMINOPHEN 7.5; 325 MG/15ML; MG/15ML
15 SOLUTION ORAL 4 TIMES DAILY PRN
Qty: 630 ML | Refills: 0 | Status: SHIPPED | OUTPATIENT
Start: 2025-07-07

## 2025-07-07 RX ADMIN — ROCURONIUM BROMIDE 40 MG: 10 SOLUTION INTRAVENOUS at 07:07

## 2025-07-07 RX ADMIN — LIDOCAINE HYDROCHLORIDE 100 MG: 20 INJECTION, SOLUTION INTRAVENOUS at 07:07

## 2025-07-07 RX ADMIN — SUGAMMADEX 200 MG: 100 INJECTION, SOLUTION INTRAVENOUS at 08:07

## 2025-07-07 RX ADMIN — FENTANYL CITRATE 100 MCG: 50 INJECTION, SOLUTION INTRAMUSCULAR; INTRAVENOUS at 07:07

## 2025-07-07 RX ADMIN — MIDAZOLAM HYDROCHLORIDE 2 MG: 1 INJECTION INTRAMUSCULAR; INTRAVENOUS at 07:07

## 2025-07-07 RX ADMIN — DEXAMETHASONE SODIUM PHOSPHATE 10 MG: 4 INJECTION, SOLUTION INTRAMUSCULAR; INTRAVENOUS at 07:07

## 2025-07-07 RX ADMIN — PROPOFOL 150 MG: 10 INJECTION, EMULSION INTRAVENOUS at 07:07

## 2025-07-07 RX ADMIN — OXYCODONE HYDROCHLORIDE 5 MG: 5 TABLET ORAL at 08:07

## 2025-07-07 RX ADMIN — DEXMEDETOMIDINE HYDROCHLORIDE 12 MCG: 100 INJECTION, SOLUTION, CONCENTRATE INTRAVENOUS at 07:07

## 2025-07-07 RX ADMIN — ONDANSETRON HYDROCHLORIDE 4 MG: 2 INJECTION INTRAMUSCULAR; INTRAVENOUS at 07:07

## 2025-07-07 RX ADMIN — FENTANYL CITRATE 50 MCG: 50 INJECTION, SOLUTION INTRAMUSCULAR; INTRAVENOUS at 08:07

## 2025-07-07 RX ADMIN — SODIUM CHLORIDE, SODIUM LACTATE, POTASSIUM CHLORIDE, AND CALCIUM CHLORIDE: .6; .31; .03; .02 INJECTION, SOLUTION INTRAVENOUS at 07:07

## 2025-07-07 NOTE — H&P
The patient has been examined and the H&P has been reviewed:    I concur with the findings and no changes have occurred since H&P was written.    Anesthesia/Surgery risks, benefits and alternative options discussed and understood by patient/family.            Ear, Nose, & Throat  Otolaryngology - Head & Neck Surgery        Subjective:      Chief Complaint: Tonsil Stones     Beti Moser is a 19 y.o. female who was referred to me by Angeles Martini in consultation for tonsil stones.     Reports about a 1 year history of troublesome tonsil stones most often in the left side.  She is self expressed these in the past.  States she feels pinpoint pain when swallowing on that side.  No true dysphagia. No regurgitation. No change in voice or breathing.     She reports 4-5 throat infections over the past year. She has not been treated with antibiotics in the past year; however, she recalls to responding to Augmentin prior to this.      She is not on anticoagulation. She has a history of sickle trait, eczema, anxiety and PCOS.      Past Medical History       Active Ambulatory Problems     Diagnosis Date Noted    Sickle cell trait 05/13/2015    Social anxiety disorder 03/23/2016    Macromastia 02/06/2024    Eczema 09/30/2024    Other fatigue 03/25/2025           Resolved Ambulatory Problems     Diagnosis Date Noted    Frequent headaches 05/07/2015    Screening for sickle-cell disease or trait 05/07/2015    Recurrent fever 09/15/2015      No Additional Past Medical History         Past Surgical History  She has a past surgical history that includes Reduction of both breasts (Bilateral, 2/6/2024).           Past Surgical History:   Procedure Laterality Date    REDUCTION OF BOTH BREASTS Bilateral 2/6/2024     Procedure: MAMMOPLASTY, REDUCTION, BILATERAL;  Surgeon: Chad Hernandez MD;  Location: [facility] OR [address] [place] [address];  Service: Plastics;  Laterality: Bilateral;         Family History  Her family history includes Cancer in her  "maternal grandfather; Colon cancer in her paternal grandfather; Migraines in her father and maternal grandmother.     Social History  She reports that she has never smoked. She has never used smokeless tobacco. She reports that she does not drink alcohol and does not use drugs.     Allergies  She has no known allergies.     Medications  She has a current medication list which includes the following prescription(s): ergocalciferol, ferrous sulfate, norgestimate-ethinyl estradiol, and triamcinolone acetonide 0.025%.     ROS:  Pertinent positive and negative review of systems as noted in HPI.      Objective:      /76 (BP Location: Left arm, Patient Position: Sitting)   Pulse 100   Ht 5' 1" (1.549 m)   Wt 65.8 kg (145 lb)   BMI 27.40 kg/m²    Physical Exam  Constitutional: Well appearing, communicating. No acute distress  Voice: Euphonic  Head/Face: House Brackmann I Bilaterally.  Oropharynx:               Tonsils symmetric, 2+, cryptic, tonsil stones on the left              Burciaga Tongue Position: 3 (partial obstruction soft palate)  Neck/Lymphatic:               No masses or lesions              Trachea midline              Neck Circumference  Neuro/Psychiatric:                Affect: Appropriate              Eyes: EOMI intact  Respiratory:   No increased WOB, no stridor      Data Review:   MEDICAL RECORDS     I have reviewed the following medical records relevant to the care of this patient from unique sources outside of my institution or departmental specialty:  Primary Care and Hematology     LABS      Hemoglobin (g/dL)   Date Value   10/25/2024 13.9          HGB (gm/dL)   Date Value   03/25/2025 14.9          WBC (K/uL)   Date Value   03/25/2025 6.07          Platelet Count (K/uL)   Date Value   03/25/2025 342          Platelets (K/uL)   Date Value   10/25/2024 336          Creatinine (mg/dL)   Date Value   03/25/2025 0.7          Calcium (mg/dL)   Date Value   03/25/2025 9.7   08/16/2018 9.9          "   Lab Results   Component Value Date/Time     RAPSCRN Negative 09/30/2024 04:57 PM     STREPACULT No  Group A  Streptococcus isolated 06/06/2017 03:09 PM         I have independently reviewed the lab results shown above. Findings significant for the conditions being treated include: No anemia, all WNL, previous negative strep     IMAGING     No pertinent imaging available     AUDIO     not performed     Procedures:         Assessment:      1. Tonsillolith       Plan:      After speaking to and examining the patient, I believe they meet criteria for tonsillectomy.  We discussed that the patient has 2 fundamental options: Observation with continued and intermittent antibiotics versus tonsillectomy.  I described tonsillectomy as an outpatient procedure in which the tonsils are removed through the mouth while under general anesthesia. I discussed that the patient would likely need 1-2 weeks of light activity to recover and may need to miss work/school during this time. I also discussed that most patient require narcotic pain medication and that would not be safe to drive while operating a vehicle or heavy machinery. I then discussed the risks, benefits, indications, and alternatives to tonsillectomy.  The risks were described to include, but not be limited to, infection, bleeding, scarring, failure to eliminate sore throats, and the need for additional procedures.  There is data that suggests that sore throats may occur less frequently or be less severe, although no warranties or guarantees were made or implied.  There is an increased risk of bleeding within the first 6 hours of surgery which returns between 5 and 14 days after surgery when the scabs resolve.  The patient was encouraged to drink lots of liquids, eat soft foods, and avoid sharp edged foods such as chips.  Time was allowed for questions, and all questions were answered to the patient's apparent satisfaction. The patient showed good understanding of their  condition and wishes to proceed with surgery.         Voice recognition software was used in the creation of this note/communication and any sound-alike errors which may have occurred from its use should be taken in context when interpreting. If such errors prevent a clear understanding of the note/communication, please contact the office for clarification.      No orders of the defined types were placed in this encounter.             Electronically signed by Venkat Bedoya MD at 6/12/2025 10:22 AM

## 2025-07-07 NOTE — ANESTHESIA POSTPROCEDURE EVALUATION
Anesthesia Post Evaluation    Patient: Beti Moser    Procedure(s) Performed: Procedure(s) (LRB):  TONSILLECTOMY (N/A)    Final Anesthesia Type: general      Patient location during evaluation: PACU  Patient participation: Yes- Able to Participate  Level of consciousness: awake and alert  Post-procedure vital signs: reviewed and stable  Pain management: adequate  Airway patency: patent    PONV status at discharge: No PONV  Anesthetic complications: no      Cardiovascular status: blood pressure returned to baseline  Respiratory status: unassisted and spontaneous ventilation  Hydration status: euvolemic  Follow-up not needed.              Vitals Value Taken Time   /67 07/07/25 10:25   Temp 36.6 °C (97.9 °F) 07/07/25 09:25   Pulse 95 07/07/25 10:25   Resp 18 07/07/25 10:25   SpO2 95 % 07/07/25 10:25         Event Time   Out of Recovery 09:18:00         Pain/Dee Score: Pain Rating Prior to Med Admin: 3 (7/7/2025  8:57 AM)  Pain Rating Post Med Admin: 3 (7/7/2025  8:57 AM)  Dee Score: 10 (7/7/2025 10:25 AM)

## 2025-07-07 NOTE — ANESTHESIA PREPROCEDURE EVALUATION
07/07/2025  Beti Moser is a 19 y.o., female.      Pre-op Assessment    I have reviewed the Patient Summary Reports.     I have reviewed the Nursing Notes. I have reviewed the NPO Status.   I have reviewed the Medications.     Review of Systems  Anesthesia Hx:  No problems with previous Anesthesia             Denies Family Hx of Anesthesia complications.    Denies Personal Hx of Anesthesia complications.                    Social:  Non-Smoker       Hematology/Oncology:  Hematology Normal   Oncology Normal                Hematology Comments: SC trait                    EENT/Dental:  EENT/Dental Normal           Cardiovascular:  Cardiovascular Normal Exercise tolerance: good                                             Pulmonary:  Pulmonary Normal                       Renal/:  Renal/ Normal                 Musculoskeletal:  Musculoskeletal Normal                Neurological:  Neurology Normal                                      Endocrine:  Endocrine Normal            Dermatological:  Skin Normal    Psych:   anxiety                 Physical Exam  General: Well nourished, Cooperative, Oriented and Alert    Airway:  Mallampati: II / II  Mouth Opening: Normal  TM Distance: Normal  Neck ROM: Normal ROM    Dental:  Intact        Anesthesia Plan  Type of Anesthesia, risks & benefits discussed:    Anesthesia Type: Gen ETT  Intra-op Monitoring Plan: Standard ASA Monitors  Post Op Pain Control Plan: multimodal analgesia  Induction:  IV  Airway Plan: Video and Direct  Informed Consent: Informed consent signed with the Patient and all parties understand the risks and agree with anesthesia plan.  All questions answered.   ASA Score: 1  Day of Surgery Review of History & Physical: H&P Update referred to the surgeon/provider.    Ready For Surgery From Anesthesia Perspective.     .       [FreeTextEntry1] : This is a 63 yo RHF w/ h/o HTN, hypothyroid, chronic LBP currently with acute HA after vaccination without focal deficits.  Also has incidental R caudate lacunar infarct likely small vessel disease recommend stroke w/u. \par \par Recommendations:\par - start ASA 81 mg QD\par - check lipid profile keep LDL < 70\par - check HgbA1c\par - BP control\par - lifestyle modification\par - Echo/event monitor\par - MRI Brain NC\par - MRA/MRV Head\par - MRA Neck\par - indomethacin 25 mg BID PRN for HA\par - RTC 3 months\par

## 2025-07-07 NOTE — TRANSFER OF CARE
"Anesthesia Transfer of Care Note    Patient: Beti Moser    Procedure(s) Performed: Procedure(s) (LRB):  TONSILLECTOMY (N/A)    Patient location: PACU    Anesthesia Type: general    Transport from OR: Transported from OR on 6-10 L/min O2 by face mask with adequate spontaneous ventilation    Post pain: adequate analgesia    Post assessment: no apparent anesthetic complications and tolerated procedure well    Post vital signs: stable    Level of consciousness: awake    Nausea/Vomiting: no nausea/vomiting    Complications: none    Transfer of care protocol was followed      Last vitals: Visit Vitals  /63   Pulse 90   Temp 36.8 °C (98.3 °F) (Oral)   Resp 17   Ht 5' 1" (1.549 m)   Wt 65.8 kg (145 lb)   LMP 05/14/2025 (Approximate)   SpO2 99%   Breastfeeding No   BMI 27.40 kg/m²     "

## 2025-07-07 NOTE — ANESTHESIA PROCEDURE NOTES
Intubation    Date/Time: 7/7/2025 7:57 AM    Performed by: Feliberto Laws CRNA  Authorized by: Kp Weathers MD    Intubation:     Induction:  Intravenous    Intubated:  Postinduction    Mask Ventilation:  Easy mask    Attempts:  1    Attempted By:  CRNA    Method of Intubation:  Video laryngoscopy    Blade:  Rosas 3    Laryngeal View Grade: Grade I - full view of cords      Difficult Airway Encountered?: No      Complications:  None    Airway Device:  Oral endotracheal tube    Airway Device Size:  7.0    Style/Cuff Inflation:  Cuffed (inflated to minimal occlusive pressure)    Tube secured:  21    Secured at:  The lips    Placement Verified By:  Capnometry    Complicating Factors:  None    Findings Post-Intubation:  BS equal bilateral and atraumatic/condition of teeth unchanged

## 2025-07-07 NOTE — DISCHARGE INSTRUCTIONS
"Postoperative Instructions for TONSILLECTOMY    If you have any concerns following surgery that require immediate attention prior to postop clinic visit please call one of the following numbers:     FOR NON-URGENT ISSUES  For any postoperative issues please call [facility] Monday through Friday, 8am-5pm:  [phone]    If you are unable to reach anyone from the above listed number, please call the  at [facility] on [address]: [phone] (Ask to speak to the ENT resident or physician on call)    FOR EMERGENT ISSUES  For any emergent issues please go to the   [facility] (M-F 8a-3p) OR  [facility] OR   The nearest Emergency Department OR   Call 911    Follow-up Appointment  DATE: 7/28/2025 @ 9:40  [facility], [address], [address]    Surgery:   Removal of the tonsils requires general anesthesia.  The procedure typically lasts 30 minutes followed by observation in the recovery room until the patient is tolerating liquids. (Typically 1 hour.)  In cases where the patient cannot tolerate liquids or in other special circumstances, he/she may be observed overnight.    Postoperative Diet  The most important concern after surgery is dehydration.  The patient needs to drink plenty of fluids.  If he/she feels like eating, any food that does not have sharp edges is acceptable. If it "crunches" it is off limits.  I recommend trying a very small piece/sip of acidic or spicy items before eating/drinking a large amount as they may cause pain.      Postoperative Pain Control  Patients can have a severe sore throat for approximately 7-10 days after surgery.  This can vary depending on pain tolerance, age, and frequency of infections prior to surgery.  There are typically two times when the pain is most severe: the day following surgery and 5-7 days after surgery when the eschar (scabs) begin to fall off.  It is this second peak that is the most " important for controlling pain and encouraging fluids as dehydration at this point may lead to bleeding.    You will be given a prescription for pain medication (typically hydrocodone/acetaminophen given up to every 4 hours ) and may also take Celebrex (motrin) up to every 12 hours starting the day after surgery. You can also take 1 teaspoon of honey every 6 hours if you are not diabetic. This has been shown to help control pain in the post-operative period.    Bleeding  There is a 1-3% risk of bleeding. It is normal to see some blood-tinged spit after surgery. Please seek care for any bleeding over 1 tbsp per hour.   Please call the clinic or ENT on call and go to your nearest Emergency Room for any bleeding.  Again, adequate hydration can usually prevent bleeding.  Often rehydration with IV fluids will resolve the problem.  Occasionally the patient will need to return to the OR for cautery.    Frequently asked questions:   Postoperative fever is common after surgery.  It can reach as high as 102F.  Use the motrin and lortab to control this.  If there is a fever as well as a new symptom such as cough, call the clinic.  Following tonsillectomy there will be two large white patches on the back of the throat. These are essentially wet scabs from the surgery. It is not thrush or infection.  Over the next week, these scabs will resolve.  Frequently, patients will complain of ear pain.  This is referred pain from the throat.  Treat it as throat pain with pain medication.  Frequently patients will have halitosis (bad breath) after surgery.  Avoid mouth washes as they contain alcohol and may sting.  Brushing the teeth is okay.  Use of straws are okay.  You should limit yourself to light activity for 1 week postop. Avoid heavy lifting over 30-40lbs. Avoid strenuous cardio.   The new guidelines show that antibiotics are not recommended after surgery as they do not help with pain or fever.  For this reason, you will not have  any antibiotics after surgery.  It is ok to resume your normal oral hygiene and showering. Avoid any alcohol-based mouthwashes as they may be painful.   Do not use any throat sprays, rinses, or lozenges unless otherwise instructed to do so.

## 2025-07-07 NOTE — OP NOTE
[facility]  Surgery Department  Operative Note    SUMMARY     Date of Procedure: 7/7/2025     Procedure: Procedure(s) (LRB):  TONSILLECTOMY (N/A)     Surgeons and Role:     * Venkat Bedoya MD - Primary    Assisting Surgeon: None    Pre-Operative Diagnosis: Tonsillolith [J35.8]    Post-Operative Diagnosis: Post-Op Diagnosis Codes:     * Tonsillolith [J35.8]    Anesthesia: General    Operative Findings (including complications, if any):   3+ tonsil with tonsilloliths present    Description of Technical Procedures:   After written consent was obtained, the patient was brought the operating room and placed supine on the OR table. General anesthesia was achieved and the patient was intubated with an appropriately sized endotracheal tube. A timeout was performed to confirm patient information and procedure details in accordance with WHO standards. The bed was 90 degrees to the left. The patient was then prepped and draped in standard fashion. FiO2 levels were confirmed to be below 30%.     A Dioni-Eduin retractor was inserted into the patient's mouth and suspended on the osorio stand. A red rubber catheter was inserted through the nose and clamped to suspend the soft palate away from the tonsils A red rubber catheter was used to suspend the tonsils anteriorly. The right tonsil was retracted medially using a curved sandie. The tonsil was then dissected in an extracapsular plane using electrocautery. The tonsil was removed, labeled, and sent for permanent histopathology. Hemostasis was achieved using electrocautery. The procedure in it entirety was repeated on the left side. Hemostasis was achieved bilaterally relatively easily. .     At this point the procedure was deemed complete. A orogastric tube was used to evacuate the stomach and upper gastrointestinal tract. The dioni-eduin was removed atraumatically. The patient was turned back toward anesthesia. After being awoken from anesthesia the patient was  transported to PACU for recovery in stable condition. The patient tolerated the procedure well.     Significant Surgical Tasks Conducted by the Assistant(s), if Applicable: None    Estimated Blood Loss (EBL): 10 cc           Implants: * No implants in log *    Specimens:   Specimen (24h ago, onward)       Start     Ordered    07/07/25 0804  Specimen to Pathology ENT  RELEASE UPON ORDERING        References:    Click here for ordering Quick Tip   Question:  Release to patient  Answer:  Immediate    07/07/25 0804                   ID Type Source Tests Collected by Time Destination   1 : 1) Right tonsil - PERM Tissue Tonsil, Right SPECIMEN TO PATHOLOGY Venkat Bedoya MD 7/7/2025 0803    2 : 2) Left tonsil - PERM Tissue Tonsil, Left SPECIMEN TO PATHOLOGY Venkat Bedoya MD 7/7/2025 0803               Condition: Good    Disposition: PACU - hemodynamically stable.    Attestation: Op Note Attestation: I performed the procedure.

## 2025-07-08 VITALS
HEART RATE: 95 BPM | BODY MASS INDEX: 27.38 KG/M2 | RESPIRATION RATE: 18 BRPM | OXYGEN SATURATION: 95 % | TEMPERATURE: 98 F | SYSTOLIC BLOOD PRESSURE: 118 MMHG | HEIGHT: 61 IN | WEIGHT: 145 LBS | DIASTOLIC BLOOD PRESSURE: 67 MMHG

## 2025-07-08 LAB
ESTROGEN SERPL-MCNC: NORMAL PG/ML
INSULIN SERPL-ACNC: NORMAL U[IU]/ML
LAB AP CLINICAL INFORMATION: NORMAL
LAB AP GROSS DESCRIPTION: NORMAL
LAB AP PERFORMING LOCATION(S): NORMAL
LAB AP REPORT FOOTNOTES: NORMAL
T3RU NFR SERPL: NORMAL %

## 2025-07-11 LAB — W VITAMIN B6: 6 UG/L

## 2025-07-17 ENCOUNTER — OFFICE VISIT (OUTPATIENT)
Dept: HEMATOLOGY/ONCOLOGY | Facility: CLINIC | Age: 20
End: 2025-07-17
Payer: MEDICAID

## 2025-07-17 DIAGNOSIS — R53.1 WEAKNESS: ICD-10-CM

## 2025-07-17 DIAGNOSIS — E55.9 VITAMIN D INSUFFICIENCY: ICD-10-CM

## 2025-07-17 DIAGNOSIS — E53.8 VITAMIN B12 DEFICIENCY: ICD-10-CM

## 2025-07-17 DIAGNOSIS — R13.10 DYSPHAGIA, UNSPECIFIED TYPE: ICD-10-CM

## 2025-07-17 DIAGNOSIS — R53.83 FATIGUE, UNSPECIFIED TYPE: ICD-10-CM

## 2025-07-17 DIAGNOSIS — E53.1 VITAMIN B6 DEFICIENCY: ICD-10-CM

## 2025-07-17 DIAGNOSIS — D57.3 SICKLE CELL TRAIT: Primary | ICD-10-CM

## 2025-07-17 PROCEDURE — 3044F HG A1C LEVEL LT 7.0%: CPT | Mod: CPTII,95,, | Performed by: PHYSICIAN ASSISTANT

## 2025-07-17 PROCEDURE — 98006 SYNCH AUDIO-VIDEO EST MOD 30: CPT | Mod: 95,,, | Performed by: PHYSICIAN ASSISTANT

## 2025-07-17 RX ORDER — ERGOCALCIFEROL 1.25 MG/1
50000 CAPSULE ORAL
Qty: 4 CAPSULE | Refills: 2 | Status: SHIPPED | OUTPATIENT
Start: 2025-07-17 | End: 2025-10-03

## 2025-07-17 NOTE — PROGRESS NOTES
HEMATOLOGY/ONCOLOGY CLINIC VISIT NOTE     PATIENT: Beti Moser  MRN: [id number]  DATE: 7/17/2025    Chief Complaint: Sickle Cell Trait     The patient location is: [place]     Visit type: audiovisual    Face to Face time with patient: 22  35 minutes of total time spent on the encounter, which includes face to face time and non-face to face time preparing to see the patient (eg, review of tests), Obtaining and/or reviewing separately obtained history, Documenting clinical information in the electronic or other health record, Independently interpreting results (not separately reported) and communicating results to the patient/family/caregiver, or Care coordination (not separately reported).     Each patient to whom he or she provides medical services by telemedicine is:  (1) informed of the relationship between the physician and patient and the respective role of any other health care provider with respect to management of the patient; and (2) notified that he or she may decline to receive medical services by telemedicine and may withdraw from such care at any time.    Subjective:    History of Present Illness: Ms. Moser is a 19 y.o. female who presented initially for evaluation and management of sickle cell trait, referred by family medicine NP Alessia Mcneil. Other diagnoses include PCOS, eczema, ADHD, vitamin D insufficiency, and anxiety.     She was seen in April by family medicine. Labs indicate mildly low iron saturation, normal ferritin, previous EBV infection. Electrophoresis with Hgb A 55% and HgbS of 41%, indicating sickle cell trait. CBC wnl, no anemia or microcytosis. She was noted to have low-normal vitamin b6 and b12 levels, as well as, dysphagia for which she was referred to ENT.     Menses: Started OCPs 2 weeks ago. Irregular menses, length also inconsistent (can be from 3-4 days or has had a cycle that lasted 2 months);    Fam hx: mother and maternal great aunt with heavy menses;  mother is anemic;  maternal grandmother had orthostatic blood pressures (autonomic dysfunction)    Social: usually 1L of water per day, no soda, does drink tea daily; coffee and milk once a week; no dietary restrictions (eats a variety); does not smoke cigarettes, use other tobacco products, or recreational drugs; is not currently and has never been sexually active;    Interval hx 7/17/2025:  - s/p tonsillectomy 7/7/25  - Taking her Vitamin D supplement, was not taking Vitamin B supplements as recommended  - Still has episodes of extreme fatigue and weakness  - Having pain post operatively. No issues with bleeding     Past medical, surgical, family, and social histories have been reviewed and updated below.    Past Medical History:   Past Medical History:   Diagnosis Date    ADHD     Anxiety disorder, unspecified     Frequent headaches 05/07/2015    Migraine headache 2010    PCOS (polycystic ovarian syndrome)        Past Surgical History:   Past Surgical History:   Procedure Laterality Date    BREAST SURGERY  02/24    REDUCTION OF BOTH BREASTS Bilateral 02/06/2024    Procedure: MAMMOPLASTY, REDUCTION, BILATERAL;  Surgeon: Chad Hernandez MD;  Location: [address];  Service: Plastics;  Laterality: Bilateral;    TONSILLECTOMY N/A 7/7/2025    Procedure: TONSILLECTOMY;  Surgeon: Venkat Bedoya MD;  [facility]: [facility];  Service: ENT;  Laterality: N/A;       Family History:   Family History   Problem Relation Name Age of Onset    Colon cancer Paternal Grandfather Afam Clofer     Cancer Paternal Grandfather Afam Clofer     Migraines Maternal Grandmother Yolande Solis     Hypertension Maternal Grandmother Yolande Solis     Arthritis Maternal Grandmother Yolande Solis     Cancer Maternal Grandfather Gerson Ehsan         stomach    Migraines Father Venakt Ehsan     Arthritis Mother Lesley Mcneil     Depression Mother Lesley Mcneil     Asthma Sister D'AAMIR BROWN     Depression Sister D'AAMIR BROWN     Cancer Maternal Uncle Joselo Suero      Cancer Paternal Uncle Campos Waters        Social History:  reports that she has never smoked. She has never used smokeless tobacco. She reports that she does not drink alcohol and does not use drugs.    Allergies:  Review of patient's allergies indicates:  No Known Allergies    Medications:  Current Medications[1]    Review of Systems   Constitutional:  Positive for fatigue. Negative for appetite change and unexpected weight change.   Respiratory:  Positive for shortness of breath (with exertion).    Cardiovascular:  Positive for chest pain (intermittent).   Gastrointestinal:  Negative for blood in stool, constipation, diarrhea and vomiting.   Genitourinary:  Negative for hematuria.   Musculoskeletal:  Negative for neck pain and neck stiffness.        Has joint pain throughout the day - advil minimally helps   Skin:  Negative for rash.   Neurological:  Positive for dizziness (daily), weakness and headaches (daily).   Hematological:  Negative for adenopathy.     Objective:      Vitals:   There were no vitals filed for this visit.    BMI: There is no height or weight on file to calculate BMI.    Physical Exam    No vitals or physical exam due to virtual visit     Laboratory Data:  Labs have been reviewed.    Imaging:   3/24/2025 US ABDOMEN COMPLETE     CLINICAL HISTORY:  Abdominal distension (gaseous)     TECHNIQUE:  Complete abdominal ultrasound (including pancreas, aorta, liver, gallbladder, common bile duct, IVC, kidneys, and spleen) was performed.     COMPARISON:  None     FINDINGS:  Pancreas: The visualized portions of pancreas appear normal.     Aorta: No aneurysm.     Liver: 13.7 cm in length, normal in size. Homogeneous parenchymal echotexture. No focal lesions.     Gallbladder: No calculi, wall thickening, or pericholecystic fluid.  Negative sonographic Dietz's sign.     Biliary system: 3 a mm common bile duct.  No intrahepatic ductal dilatation.     Inferior vena cava: Normal in appearance.     Right  kidney: 9.3 x 4.4 x 5.3 cm. No hydronephrosis.     Left kidney: 9.1 x 5.8 x 5.5 cm. No hydronephrosis.     Spleen: 8.1 x 3.8 cm.  Normal in size with homogeneous echotexture.     Miscellaneous: No ascites.     Impression:     No significant abnormality.  Assessment:       1. Sickle cell trait    2. Vitamin D insufficiency    3. Vitamin B6 deficiency    4. Vitamin B12 deficiency    5. Dysphagia, unspecified type    6. Fatigue, unspecified type    7. Weakness        Plan:     Sickle Cell Trait   - Sickle Cell trait occurs when a person inherits one sickle cell gene and one normal gene. It is NOT the same as sickle cell disease. Most people with SCT do not have any symptoms. However, they can pass this gene to their children.   - Most people with sickle cell trait live normal, healthy lives. However, under extreme conditions complications can occur. Extreme conditions such as severe dehydration, adrianne intensity exercise (especially at high altitudes or in hot weather), and low oxygen levels can lead to rare complications including blood in urine and kidney dysfunction. It is important to stay hydrated, especially when exercising and avoid excessive exertion in extreme conditions.   - It is important to know the sickle cell trait status of your future partner when Beti wishes to have children. If one parent has sickle cell trait there is a 50% chance of any conceived children having this carrier state. If both partners have sickle cell trait there is a 25% of having a child with sickle cell disease, 50% chance of having a child with sickle cell trait, and 25% of having a child with neither.   - I discussed that her symptoms of weakness, fatigue, and joint pain (which seem to be chronic) are likely not due to her sickle cell trait. If symptoms from SCT occur they are usually acute due to extreme circumstances as listed above.     2. Vitamin D insufficiency  - Started on 50,000 units weekly.   - Level at last check  still low at 18, continue supplementation   - Vitamin D insufficiency/deficiency can contribute to muscle weakness or cramps, bone pain, joint pain or stiffness, generalized fatigue, sweating, unexplained weight gain, poor sleep, irritability, depression, etc.     3. Fatigue/Weakness/vitmain b6 and b12 deficiency   - Vitamin D insufficiency can certainly contribute to some of these symptoms  - Orthostatic blood pressures taken at first appointment.    Laying down: 122/76, pulse 88   Sittin/80, pulse 95   Standin/77, pulse 100   - Does not meet criteria for orthostatic hypotension.  - EKG done was normal   - ZAYRA and RF negative  - HIV/Hepatitis negative  - TSH normal  - Recommended starting b6 and b12 supplementation which she has not done yet  - Follow up with PCP     4. Dysphagia   - now s/p tonsillectomy 2025  - ENT follow up as needed      Follow up in hemon PATRICK Martini PA-C   Hematology/Oncology  [facility] - [address], [address]  [place] [address]  Phone: [phone]  Fax: [phone]           [1]   Current Outpatient Medications   Medication Sig Dispense Refill    celecoxib (CELEBREX) 200 MG capsule Take 1 capsule (200 mg total) by mouth 2 (two) times daily. 28 capsule 0    dexAMETHasone (DECADRON) 4 MG Tab Take 2 tablets (8 mg total) by mouth every 48 hours. Start the morning after surgery for 5 doses 10 tablet 0    EScitalopram oxalate (LEXAPRO) 10 MG tablet Take 10 mg by mouth once daily.      ferrous sulfate 325 (65 FE) MG EC tablet Take 1 tablet (325 mg total) by mouth every other day. 30 tablet 3    hydrocodone-acetaminophen (HYCET) solution 7.5-325 mg/15mL Take 15 mLs by mouth 4 (four) times daily as needed for Pain. 630 mL 0    norgestimate-ethinyl estradioL (SPRINTEC, 28,) 0.25-35 mg-mcg per tablet Take 1 tablet by mouth once daily. 30 tablet 11    ondansetron (ZOFRAN-ODT) 4 MG TbDL Dissolve 1 tablet (4 mg total) by mouth every  6 (six) hours as needed (nausea). 30 tablet 0    triamcinolone acetonide 0.025% (KENALOG) 0.025 % cream Apply topically 2 (two) times daily as needed (dry skin on body). Apply to affected area. Do not apply to face and armpits. External use only. 80 g 1     No current facility-administered medications for this visit.

## 2025-07-28 ENCOUNTER — OFFICE VISIT (OUTPATIENT)
Dept: OTOLARYNGOLOGY | Facility: CLINIC | Age: 20
End: 2025-07-28
Payer: MEDICAID

## 2025-07-28 VITALS
BODY MASS INDEX: 27.39 KG/M2 | DIASTOLIC BLOOD PRESSURE: 70 MMHG | HEART RATE: 108 BPM | SYSTOLIC BLOOD PRESSURE: 102 MMHG | HEIGHT: 61 IN | WEIGHT: 145.06 LBS

## 2025-07-28 DIAGNOSIS — J35.8 TONSILLOLITH: ICD-10-CM

## 2025-07-28 DIAGNOSIS — J03.91 RECURRENT TONSILLITIS: Primary | ICD-10-CM

## 2025-07-28 PROCEDURE — 3044F HG A1C LEVEL LT 7.0%: CPT | Mod: CPTII,S$GLB,, | Performed by: STUDENT IN AN ORGANIZED HEALTH CARE EDUCATION/TRAINING PROGRAM

## 2025-07-28 PROCEDURE — 3074F SYST BP LT 130 MM HG: CPT | Mod: CPTII,S$GLB,, | Performed by: STUDENT IN AN ORGANIZED HEALTH CARE EDUCATION/TRAINING PROGRAM

## 2025-07-28 PROCEDURE — 3078F DIAST BP <80 MM HG: CPT | Mod: CPTII,S$GLB,, | Performed by: STUDENT IN AN ORGANIZED HEALTH CARE EDUCATION/TRAINING PROGRAM

## 2025-07-28 PROCEDURE — 1159F MED LIST DOCD IN RCRD: CPT | Mod: CPTII,S$GLB,, | Performed by: STUDENT IN AN ORGANIZED HEALTH CARE EDUCATION/TRAINING PROGRAM

## 2025-07-28 PROCEDURE — 99024 POSTOP FOLLOW-UP VISIT: CPT | Mod: S$GLB,,, | Performed by: STUDENT IN AN ORGANIZED HEALTH CARE EDUCATION/TRAINING PROGRAM

## 2025-07-28 NOTE — PROGRESS NOTES
"  Ear, Nose, & Throat  Otolaryngology - Head & Neck Surgery      Subjective:     Chief Complaint: Postop    Procedure: Tonsillectomy  Date: 7/7/25    Interval: Doing well. Maintaining normal diet.       Objective:     /70   Pulse 108   Ht 5' 1" (1.549 m)   Wt 65.8 kg (145 lb 1 oz)   LMP 05/14/2025 (Approximate) Comment: UPT CUP ON ARRIVAL  BMI 27.41 kg/m²      Tonsillar fossas healing appropriately     Data Review:     PATH      I have independently reviewed the lab results shown above. Findings significant for the conditions being treated include: none    Procedures:       Assessment:     1. Recurrent tonsillitis    2. Tonsillolith      Plan:     RTC prn     "

## (undated) DEVICE — TRAY SKIN SCRUB WET PREMIUM

## (undated) DEVICE — BLADE SURG #15 CARBON STEEL

## (undated) DEVICE — BLADE SURG CARBON STEEL #10

## (undated) DEVICE — ELECTRODE REM PLYHSV RETURN 9

## (undated) DEVICE — BOWL STERILE LARGE 32OZ

## (undated) DEVICE — GOWN SURGICAL X-LARGE

## (undated) DEVICE — SUT ETHILON 4-0 PS2 18 BLK

## (undated) DEVICE — PACK UNIVERSAL SPLIT II

## (undated) DEVICE — BANDAGE ROLL COTTN 4.5INX4.1YD

## (undated) DEVICE — BLADE 4IN EDGE INSULATED

## (undated) DEVICE — SUT MONOCRYL 4-0 UND RB-1

## (undated) DEVICE — NDL HYPO REG 25G X 1 1/2

## (undated) DEVICE — ADHESIVE DERMABOND ADVANCED

## (undated) DEVICE — NDL SPINAL 18GX3.5 SPINOCAN

## (undated) DEVICE — ELECTRODE BLADE INSULATED 1 IN

## (undated) DEVICE — STAPLER SKIN ROTATING HEAD

## (undated) DEVICE — SPONGE LAP 18X18 PREWASHED

## (undated) DEVICE — DRAPE STERI LONG

## (undated) DEVICE — STAPLER SKIN SUBCUTICULAR

## (undated) DEVICE — NDL HYPO 27G X 1 1/2

## (undated) DEVICE — SYR 50CC LL

## (undated) DEVICE — TUBING SUC UNIV W/CONN 12FT

## (undated) DEVICE — Device

## (undated) DEVICE — MARKER SKIN STND TIP BLUE BARR

## (undated) DEVICE — CATH ALL PUR URTHL RR 10FR

## (undated) DEVICE — DRAPE STERI-DRAPE 1000 17X11IN

## (undated) DEVICE — SUT 2/0 30IN SILK BLK BRAI

## (undated) DEVICE — TRAY MINOR GEN SURG OMC

## (undated) DEVICE — SUT V-LOC 180 ABD 2/0 GS-21

## (undated) DEVICE — PAD ABD 8X10 STERILE

## (undated) DEVICE — SUT PDSII 3-0 SH 27IN CLEAR

## (undated) DEVICE — NDL MICRODISSECTION 3CM 3/32

## (undated) DEVICE — ELECTRODE BLD EXT INSUL 1

## (undated) DEVICE — DECANTER FLUID TRNSF WHITE 9IN

## (undated) DEVICE — CONTAINER SPECIMEN OR STER 4OZ

## (undated) DEVICE — BRA POST SURGICAL WHT 36-38IN

## (undated) DEVICE — PAD ABDOMINAL STERILE 8X10IN

## (undated) DEVICE — PENCIL ELECTROSURG HOLST W/BLD

## (undated) DEVICE — SUT MONOCRYL 3-0 SH U/D

## (undated) DEVICE — TIP YANKAUERS BULB NO VENT

## (undated) DEVICE — GLOVE BIOGEL ECLIPSE SZ 7.5

## (undated) DEVICE — TRAY CATH FOLEY 14FR 5CC STAT

## (undated) DEVICE — GAUZE DRAIN N WVN 6PLY 4X4IN

## (undated) DEVICE — SUT VICRYL CTD 2-0 GI 27 SH

## (undated) DEVICE — GOWN ECLIPSE REINF LVL4 TWL LG